# Patient Record
Sex: MALE | Race: WHITE | NOT HISPANIC OR LATINO
[De-identification: names, ages, dates, MRNs, and addresses within clinical notes are randomized per-mention and may not be internally consistent; named-entity substitution may affect disease eponyms.]

---

## 2018-08-07 PROBLEM — Z00.00 ENCOUNTER FOR PREVENTIVE HEALTH EXAMINATION: Status: ACTIVE | Noted: 2018-08-07

## 2018-08-28 ENCOUNTER — OTHER (OUTPATIENT)
Age: 68
End: 2018-08-28

## 2018-08-28 DIAGNOSIS — M25.559 PAIN IN UNSPECIFIED HIP: ICD-10-CM

## 2018-09-04 ENCOUNTER — APPOINTMENT (OUTPATIENT)
Dept: ORTHOPEDIC SURGERY | Facility: CLINIC | Age: 68
End: 2018-09-04
Payer: OTHER MISCELLANEOUS

## 2018-09-04 VITALS
DIASTOLIC BLOOD PRESSURE: 69 MMHG | HEIGHT: 69 IN | SYSTOLIC BLOOD PRESSURE: 120 MMHG | BODY MASS INDEX: 33.33 KG/M2 | HEART RATE: 93 BPM | WEIGHT: 225 LBS | TEMPERATURE: 97.8 F

## 2018-09-04 DIAGNOSIS — Z87.39 PERSONAL HISTORY OF OTHER DISEASES OF THE MUSCULOSKELETAL SYSTEM AND CONNECTIVE TISSUE: ICD-10-CM

## 2018-09-04 DIAGNOSIS — Z78.9 OTHER SPECIFIED HEALTH STATUS: ICD-10-CM

## 2018-09-04 DIAGNOSIS — Z86.39 PERSONAL HISTORY OF OTHER ENDOCRINE, NUTRITIONAL AND METABOLIC DISEASE: ICD-10-CM

## 2018-09-04 DIAGNOSIS — F17.200 NICOTINE DEPENDENCE, UNSPECIFIED, UNCOMPLICATED: ICD-10-CM

## 2018-09-04 PROCEDURE — 99205 OFFICE O/P NEW HI 60 MIN: CPT

## 2018-09-04 PROCEDURE — 73502 X-RAY EXAM HIP UNI 2-3 VIEWS: CPT | Mod: RT

## 2018-09-04 RX ORDER — CYCLOBENZAPRINE HYDROCHLORIDE 10 MG/1
10 TABLET, FILM COATED ORAL
Qty: 60 | Refills: 0 | Status: ACTIVE | COMMUNITY
Start: 2018-08-16

## 2018-09-04 RX ORDER — ROSUVASTATIN CALCIUM 40 MG/1
40 TABLET, FILM COATED ORAL
Qty: 90 | Refills: 0 | Status: ACTIVE | COMMUNITY
Start: 2018-04-20

## 2018-09-04 RX ORDER — PIOGLITAZONE HYDROCHLORIDE 45 MG/1
45 TABLET ORAL
Qty: 90 | Refills: 0 | Status: ACTIVE | COMMUNITY
Start: 2018-04-20

## 2019-09-09 ENCOUNTER — TRANSCRIPTION ENCOUNTER (OUTPATIENT)
Age: 69
End: 2019-09-09

## 2019-09-10 ENCOUNTER — OUTPATIENT (OUTPATIENT)
Dept: OUTPATIENT SERVICES | Facility: HOSPITAL | Age: 69
LOS: 1 days | End: 2019-09-10
Payer: COMMERCIAL

## 2019-09-10 DIAGNOSIS — M16.51 UNILATERAL POST-TRAUMATIC OSTEOARTHRITIS, RIGHT HIP: ICD-10-CM

## 2019-09-10 LAB — GLUCOSE BLDC GLUCOMTR-MCNC: 96 MG/DL — SIGNIFICANT CHANGE UP (ref 70–99)

## 2019-09-10 PROCEDURE — 82962 GLUCOSE BLOOD TEST: CPT

## 2019-09-10 PROCEDURE — 20610 DRAIN/INJ JOINT/BURSA W/O US: CPT | Mod: RT

## 2019-09-10 PROCEDURE — 77002 NEEDLE LOCALIZATION BY XRAY: CPT

## 2020-02-04 ENCOUNTER — APPOINTMENT (OUTPATIENT)
Dept: ORTHOPEDIC SURGERY | Facility: CLINIC | Age: 70
End: 2020-02-04
Payer: OTHER MISCELLANEOUS

## 2020-02-04 VITALS
HEART RATE: 108 BPM | HEIGHT: 69 IN | WEIGHT: 225 LBS | BODY MASS INDEX: 33.33 KG/M2 | DIASTOLIC BLOOD PRESSURE: 61 MMHG | SYSTOLIC BLOOD PRESSURE: 95 MMHG

## 2020-02-04 PROCEDURE — 73502 X-RAY EXAM HIP UNI 2-3 VIEWS: CPT | Mod: RT

## 2020-02-04 PROCEDURE — 99215 OFFICE O/P EST HI 40 MIN: CPT

## 2020-02-04 NOTE — DISCUSSION/SUMMARY
[de-identified] : The patient is a 69 year old male with bone on bone arthritis of the right hip. He was recommended to quit smoking prior to surgery. Based upon the patients continued symptoms and failure to respond to conservative treatment I have recommended a right total hip replacement for the patient. A discussion was had with the patient regarding a right total hip replacement. Anterior and posterior exposures were discussed. The patient would like to proceed with an anterior approach. A long discussion was had with the patient as what the total joint replacement would entail. A model was used to demonstrate the operation and to discuss bearing surfaces of the implants. The hospitalization and rehabilitation were discussed. The use of perioperative antibiotics and DVT prophylaxis were discussed. The risks, benefits and alternatives to surgical intervention were discussed at length with the patient. Specific risks discussed included: infection, wound breakdown, numbness and damage to nerves, tendon, muscle, arteries or other blood vessels, and the possibility of leg length discrepancy. The possibility of recurrent pain, no improvement in pain and actual worsening of the pain were also mentioned in conversation with the patient. Medical complications related to the patient's general medical health including deep vein thrombosis, pulmonary embolus, heart attack, stroke, death and other complications from anesthesia were discussed as well. The patient was told that we will take steps to minimize these risks by using sterile technique, antibiotics and DVT prophylaxis when appropriate and following the patient postoperatively in the clinic setting to monitor progress. The benefits of surgery were discussed with the patient including the potential to improve the current clinical condition through operative intervention. Alternatives to surgical intervention include continued conservative management which may yield less than optimal results in this particular patient. All questions were answered to the satisfaction of the patient. Models were used as an educational tool. We did discuss implant choices and fixation, with shared decision making with the patient.\par  \par The patient smokes 1 pack/day.  He was advised to stop smoking prior to surgery.  He was offered medication and or a patch by his primary care doctor and he will follow-up to start the smoking cessation program with him.  We discussed that smoking can increase his risk of wound healing complications and infection more than 5 fold.  I would like to see him back 1 month prior to surgery to see if he has been successful with stopping smoking.

## 2020-02-04 NOTE — HISTORY OF PRESENT ILLNESS
[de-identified] : The patient is a 69 year old male being seen for evaluation of his right hip. He has advanced osteoarthritis of the right hip. Last seen in the office in September of 2018 at which time he wished to schedule joint replacement, however he needed worker compensation approval. He is ambulating and transferring with pain and stiffness. He reports pain is centered in the groin. He reports locking and giving way. He has difficulty donning/doffing shoes and socks and difficulty with transitioning in and out of the car. He previously received ultrasound-guided cortisone injections in the right hip with mild relief of symptoms. He denies history of PE or DVT. He reports a history of smoking. His right hip pain is severely limiting his quality of life at this time. He comes in today for repeat evaluation and for further treatment options.

## 2020-02-04 NOTE — ADDENDUM
[FreeTextEntry1] : This note was authored by Ahmet Flynn working as a medical scribe for Dr. Vu Pacheco. The note was reviewed, edited, and revised by Dr. Vu Pacheco whom is in agreement with the exam findings, imaging findings, and treatment plan. 02/04/2020.

## 2020-02-04 NOTE — PHYSICAL EXAM
[de-identified] : The patient appears well nourished  and in no apparent distress.  The patient is alert and oriented to person, place, and time.   Affect and mood appear normal. The head is normocephalic and atraumatic.  The eyes reveal normal sclera and extra ocular muscles are intact. The tongue is midline with no apparent lesions.  Skin shows normal turgor with no evidence of eczema or psoriasis.  No respiratory distress noted.  Sensation grossly intact.\par   [de-identified] : Exam of the right hip shows hip flexion of 80 degrees, hip external rotation of 30 degrees, internal rotation is neutral and painful. 5/5 motor strength bilaterally distally. Sensation intact distally. Difficulty lifting the right leg onto the exam table. The right leg is 2 cm short compared to the left. [de-identified] : Xray- AP pelvis and 2 views right hip shows bone on bone arthritis of the right hip.

## 2020-03-10 ENCOUNTER — APPOINTMENT (OUTPATIENT)
Dept: ORTHOPEDIC SURGERY | Facility: CLINIC | Age: 70
End: 2020-03-10
Payer: OTHER MISCELLANEOUS

## 2020-03-10 VITALS
WEIGHT: 225 LBS | HEART RATE: 92 BPM | BODY MASS INDEX: 33.33 KG/M2 | HEIGHT: 69 IN | SYSTOLIC BLOOD PRESSURE: 126 MMHG | DIASTOLIC BLOOD PRESSURE: 73 MMHG

## 2020-03-10 PROCEDURE — 99212 OFFICE O/P EST SF 10 MIN: CPT

## 2020-03-10 NOTE — DISCUSSION/SUMMARY
[de-identified] : The patient is a 69 year old male with bone on bone arthritis of the right hip.  The patient has almost completely stopped smoking which is excellent.  He was recently approved for Chantyx and will stop smoking altogether at this point.  We will therefore move forward with surgery as scheduled.

## 2020-03-10 NOTE — PHYSICAL EXAM
[de-identified] : The patient appears well nourished  and in no apparent distress.  The patient is alert and oriented to person, place, and time.   Affect and mood appear normal. The head is normocephalic and atraumatic.  The eyes reveal normal sclera and extra ocular muscles are intact. The tongue is midline with no apparent lesions.  Skin shows normal turgor with no evidence of eczema or psoriasis.  No respiratory distress noted.  Sensation grossly intact.\par   [de-identified] : Exam of the right hip shows hip flexion of 80 degrees, hip external rotation of 30 degrees, internal rotation is neutral and painful. 5/5 motor strength bilaterally distally. Sensation intact distally. Difficulty lifting the right leg onto the exam table. The right leg is 2 cm short compared to the left.

## 2020-03-10 NOTE — HISTORY OF PRESENT ILLNESS
[de-identified] : The patient is a 69 year old male being seen for evaluation of his right hip. He has advanced osteoarthritis of the right hip. He is scheduled for surgery for April 8th. He is ambulating and transferring with pain and stiffness. He reports pain is centered in the groin. He reports locking and giving way. He has difficulty donning/doffing shoes and socks and difficulty with transitioning in and out of the car. He previously received ultrasound-guided cortisone injections in the right hip with mild relief of symptoms. He denies history of PE or DVT. He reports a history of smoking. His right hip pain is severely limiting his quality of life at this time. He comes in today to evaluate his progress on discontinuing smoking prior to surgery.  Reports smoking only 5 cigarettes since his last visit, his last cigarette was 3 days ago.

## 2020-03-10 NOTE — ADDENDUM
[FreeTextEntry1] : This note was authored by Ahmet Flynn working as a medical scribe for Dr. Vu Pacheco. The note was reviewed, edited, and revised by Dr. Vu Pacheco whom is in agreement with the exam findings, imaging findings, and treatment plan. 03/10/2020.

## 2020-04-08 ENCOUNTER — APPOINTMENT (OUTPATIENT)
Dept: ORTHOPEDIC SURGERY | Facility: HOSPITAL | Age: 70
End: 2020-04-08

## 2020-04-09 PROBLEM — M25.559 HIP PAIN: Status: ACTIVE | Noted: 2018-08-28

## 2020-04-23 ENCOUNTER — APPOINTMENT (OUTPATIENT)
Dept: ORTHOPEDIC SURGERY | Facility: CLINIC | Age: 70
End: 2020-04-23

## 2020-05-21 ENCOUNTER — APPOINTMENT (OUTPATIENT)
Dept: ORTHOPEDIC SURGERY | Facility: CLINIC | Age: 70
End: 2020-05-21

## 2020-09-03 ENCOUNTER — APPOINTMENT (OUTPATIENT)
Dept: ORTHOPEDIC SURGERY | Facility: CLINIC | Age: 70
End: 2020-09-03

## 2020-09-04 ENCOUNTER — TRANSCRIPTION ENCOUNTER (OUTPATIENT)
Age: 70
End: 2020-09-04

## 2020-09-16 ENCOUNTER — APPOINTMENT (OUTPATIENT)
Dept: ORTHOPEDIC SURGERY | Facility: HOSPITAL | Age: 70
End: 2020-09-16

## 2020-10-08 ENCOUNTER — APPOINTMENT (OUTPATIENT)
Dept: ORTHOPEDIC SURGERY | Facility: CLINIC | Age: 70
End: 2020-10-08

## 2020-10-09 ENCOUNTER — APPOINTMENT (OUTPATIENT)
Dept: ORTHOPEDIC SURGERY | Facility: CLINIC | Age: 70
End: 2020-10-09
Payer: OTHER MISCELLANEOUS

## 2020-10-09 VITALS
BODY MASS INDEX: 31.1 KG/M2 | DIASTOLIC BLOOD PRESSURE: 63 MMHG | WEIGHT: 210 LBS | SYSTOLIC BLOOD PRESSURE: 102 MMHG | HEIGHT: 69 IN | HEART RATE: 108 BPM

## 2020-10-09 DIAGNOSIS — M16.11 UNILATERAL PRIMARY OSTEOARTHRITIS, RIGHT HIP: ICD-10-CM

## 2020-10-09 PROCEDURE — 73502 X-RAY EXAM HIP UNI 2-3 VIEWS: CPT | Mod: RT

## 2020-10-09 PROCEDURE — 99214 OFFICE O/P EST MOD 30 MIN: CPT

## 2020-10-09 NOTE — HISTORY OF PRESENT ILLNESS
[de-identified] : The patient is a 69 year old male being seen for evaluation of his right hip. He has advanced osteoarthritis of the right hip. He reports undergoing cardiac stenting in August of this year and is on Plavix.  He also underwent femoral bypass.  He reports being cleared by his vascular surgeon with regard to his femoral bypass.  However he is now on anticoagulation for his cardiac stent.  He is ambulating and transferring with pain and stiffness. He reports using a cane for stability. He reports pain is centered in the groin with radiation down the leg anterolaterally. He reports locking and giving way. He has difficulty donning/doffing shoes and socks and difficulty with transitioning in and out of the car. He previously received ultrasound-guided cortisone injections in the right hip with minimal relief of symptoms.. His right hip pain is severely limiting his quality of life at this time. He comes in today to evaluate and for further treatment options.

## 2020-10-09 NOTE — PHYSICAL EXAM
[de-identified] : The patient appears well nourished  and in no apparent distress.  The patient is alert and oriented to person, place, and time.   Affect and mood appear normal. The head is normocephalic and atraumatic.  The eyes reveal normal sclera and extra ocular muscles are intact. The tongue is midline with no apparent lesions.  Skin shows normal turgor with no evidence of eczema or psoriasis.  No respiratory distress noted.  Sensation grossly intact.\par   [de-identified] : Exam of the right hip shows difficulty with SLR, hip flexion of 70 degrees, hip external rotation of 15 degrees, internal rotation is neutral. 5/5 motor strength bilaterally distally. Sensation intact distally.  [de-identified] : Xray- AP pelvis and 2 views right hip shows bone on bone arthritis of the right hip.

## 2020-10-09 NOTE — ADDENDUM
[FreeTextEntry1] : This note was authored by Ahmet Flynn working as a medical scribe for Dr. Vu Pacheco. The note was reviewed, edited, and revised by Dr. Vu Pacheco whom is in agreement with the exam findings, imaging findings, and treatment plan. 10/09/2020.

## 2020-10-09 NOTE — DISCUSSION/SUMMARY
[de-identified] : The patient is a 70 year old male with bone on bone arthritis of the right hip.  His hip replacement surgery was postponed because of the need for vascular bypass surgery to his lower extremity as well as cardiac stenting.  He is now on Plavix.  He is going to consult with his cardiologist regarding timing of hip replacement surgery with regard to stopping his Plavix which typically is 1 year after stenting.  Because of that I recommended another cortisone injection in the right hip for now.

## 2020-10-12 ENCOUNTER — FORM ENCOUNTER (OUTPATIENT)
Age: 70
End: 2020-10-12

## 2020-10-20 ENCOUNTER — FORM ENCOUNTER (OUTPATIENT)
Age: 70
End: 2020-10-20

## 2020-10-29 ENCOUNTER — APPOINTMENT (OUTPATIENT)
Dept: ORTHOPEDIC SURGERY | Facility: CLINIC | Age: 70
End: 2020-10-29

## 2021-11-17 ENCOUNTER — APPOINTMENT (OUTPATIENT)
Dept: DERMATOLOGY | Facility: CLINIC | Age: 71
End: 2021-11-17
Payer: MEDICARE

## 2021-11-17 PROCEDURE — 17004 DESTROY PREMAL LESIONS 15/>: CPT

## 2021-11-17 PROCEDURE — 99203 OFFICE O/P NEW LOW 30 MIN: CPT | Mod: 25

## 2021-12-07 ENCOUNTER — APPOINTMENT (OUTPATIENT)
Dept: ORTHOPEDIC SURGERY | Facility: CLINIC | Age: 71
End: 2021-12-07
Payer: OTHER MISCELLANEOUS

## 2021-12-07 VITALS
HEIGHT: 69 IN | DIASTOLIC BLOOD PRESSURE: 72 MMHG | BODY MASS INDEX: 30.81 KG/M2 | HEART RATE: 89 BPM | SYSTOLIC BLOOD PRESSURE: 111 MMHG | WEIGHT: 208 LBS

## 2021-12-07 PROCEDURE — 99215 OFFICE O/P EST HI 40 MIN: CPT

## 2021-12-07 PROCEDURE — 73502 X-RAY EXAM HIP UNI 2-3 VIEWS: CPT | Mod: RT

## 2021-12-07 PROCEDURE — 99072 ADDL SUPL MATRL&STAF TM PHE: CPT

## 2021-12-07 RX ORDER — OXYCODONE HYDROCHLORIDE AND ACETAMINOPHEN 10; 325 MG/1; MG/1
10-325 TABLET ORAL
Refills: 0 | Status: ACTIVE | COMMUNITY

## 2021-12-07 RX ORDER — AMOXICILLIN AND CLAVULANATE POTASSIUM 875; 125 MG/1; MG/1
875-125 TABLET, COATED ORAL
Qty: 20 | Refills: 0 | Status: DISCONTINUED | COMMUNITY
Start: 2018-04-20 | End: 2021-12-07

## 2021-12-07 RX ORDER — EZETIMIBE 10 MG/1
10 TABLET ORAL
Refills: 0 | Status: ACTIVE | COMMUNITY

## 2021-12-07 RX ORDER — SEMAGLUTIDE 0.68 MG/ML
INJECTION, SOLUTION SUBCUTANEOUS
Refills: 0 | Status: ACTIVE | COMMUNITY

## 2021-12-07 RX ORDER — LIRAGLUTIDE 6 MG/ML
18 INJECTION SUBCUTANEOUS
Qty: 27 | Refills: 0 | Status: DISCONTINUED | COMMUNITY
Start: 2017-11-14 | End: 2021-12-07

## 2021-12-07 RX ORDER — OXYCODONE 13.5 MG/1
13.5 CAPSULE, EXTENDED RELEASE ORAL
Refills: 0 | Status: ACTIVE | COMMUNITY

## 2021-12-07 RX ORDER — TAMSULOSIN HYDROCHLORIDE 0.4 MG/1
0.4 CAPSULE ORAL
Refills: 0 | Status: ACTIVE | COMMUNITY

## 2021-12-07 RX ORDER — CLOPIDOGREL 75 MG/1
75 TABLET, FILM COATED ORAL
Refills: 0 | Status: ACTIVE | COMMUNITY

## 2021-12-07 RX ORDER — ASPIRIN ENTERIC COATED TABLETS 81 MG 81 MG/1
81 TABLET, DELAYED RELEASE ORAL
Refills: 0 | Status: ACTIVE | COMMUNITY

## 2021-12-07 RX ORDER — HYDROCODONE BITARTRATE AND ACETAMINOPHEN 10; 325 MG/1; MG/1
10-325 TABLET ORAL
Qty: 120 | Refills: 0 | Status: DISCONTINUED | COMMUNITY
Start: 2018-06-13 | End: 2021-12-07

## 2021-12-07 RX ORDER — LOSARTAN POTASSIUM 25 MG/1
25 TABLET, FILM COATED ORAL
Qty: 90 | Refills: 0 | Status: DISCONTINUED | COMMUNITY
Start: 2018-04-20 | End: 2021-12-07

## 2021-12-07 NOTE — HISTORY OF PRESENT ILLNESS
[de-identified] : Patient is a 71-year-old male presenting for follow-up evaluation of right hip pain.  This is a workers comp case related to an incident in 2006 when a washing machine fell onto the patient.  His right hip pain is localized to the right groin and is worse with all weightbearing to be putting walking distance and rising from seated position.  He admits to worsening stiffness of the hip which is now interfering with ADLs such as putting on socks and shoes.  He has not had recent injections into the hip.  He has tried therapy without significant improvement.  He takes Tylenol and NSAIDs without significant relief.  Patient had cardiac stenting in August 2020, now currently only on aspirin 81.  He also had a femoral artery bypass in August 2020 without residual issues today. He presents hopeful to discus THR, and states he has been cleared by his cardiologist and vascular doctors. \par DM with unknown A1C

## 2021-12-07 NOTE — ADDENDUM
[FreeTextEntry1] : This note was authored by Bautista Montoya working as a medical scribe for Dr. Vu Pacheco. The note was reviewed, edited, and revised by Dr. Vu Pacheco whom is in agreement with the exam findings, imaging findings, and treatment plan. 12/07/2021

## 2021-12-07 NOTE — DISCUSSION/SUMMARY
[de-identified] : BARAK DUBON is a 71 year male who presents with.  Based upon the patients continued symptoms and failure to respond to conservative treatment I have recommended a right total hip replacement for the patient. A discussion was had with the patient regarding a right total hip replacement. Anterior and posterior exposures were discussed. For this patient an anterior approach is appropriate and will confirm with vascular that this approach wont encroach on the femoral bypass site. A long discussion was had with the patient as what the total joint replacement would entail. A model was used to demonstrate the operation and to discuss bearing surfaces of the implants. The hospitalization and rehabilitation were discussed. The use of perioperative antibiotics and DVT prophylaxis were discussed. The risks, benefits and alternatives to surgical intervention were discussed at length with the patient. Specific risks discussed included: infection, wound breakdown, numbness and damage to nerves, tendon, muscle, arteries or other blood vessels, and the possibility of leg length discrepancy. The possibility of recurrent pain, no improvement in pain and actual worsening of the pain were also mentioned in conversation with the patient. Medical complications related to the patient's general medical health including deep vein thrombosis, pulmonary embolism, heart attack, stroke, death and other complications from anesthesia were discussed as well. The patient was told that we will take steps to minimize these risks by using sterile technique, antibiotics and DVT prophylaxis when appropriate and following the patient postoperatively in the clinic setting to monitor progress. The benefits of surgery were discussed with the patient including the potential to improve the current clinical condition through operative intervention. Alternatives to surgical intervention include continued conservative management which may yield less than optimal results in this particular patient. All questions were answered to the satisfaction of the patient. Models were used as an educational tool. We did discuss implant choices and fixation, with shared decision making with the patient.		 \par We are planning for robotic assistance for the total hip replacement. We discussed that this will require placement of iliac crest pins in the contralateral iliac crest for pelvic bone registration to allow for robotic guidance for the surgery. We will utilize robotic assistance to improve accuracy of the implants, and accurate restoration of both leg lengths and femoral offset.

## 2021-12-07 NOTE — REASON FOR VISIT
[Follow-Up Visit] : a follow-up visit for [Workers' Comp: Date of Injury: _______] : This visit is related to worker's compensation. Date of Injury: [unfilled] [Other: ____] : [unfilled]

## 2021-12-07 NOTE — PHYSICAL EXAM
[de-identified] : The patient appears well nourished  and in no apparent distress.  The patient is alert and oriented to person, place, and time.   Affect and mood appear normal. The head is normocephalic and atraumatic.  The eyes reveal normal sclera and extra ocular muscles are intact. The tongue is midline with no apparent lesions.  Skin shows normal turgor with no evidence of eczema or psoriasis.  No respiratory distress noted.  Sensation grossly intact.		  [de-identified] : Exam of the right hip shows hip flexion of 70 degrees, hip external rotation of 15 degrees, hip internal rotation of 0 degrees. there are palpable DP and PT pulses RLE.\par  5/5 motor strength bilaterally distally. Sensation intact distally.	  [de-identified] : X-ray: AP view of the pelvis and 2 views of the right hip demonstrate bone on bone arthritis.

## 2022-01-18 ENCOUNTER — OUTPATIENT (OUTPATIENT)
Dept: OUTPATIENT SERVICES | Facility: HOSPITAL | Age: 72
LOS: 1 days | End: 2022-01-18
Payer: COMMERCIAL

## 2022-01-18 VITALS
HEART RATE: 52 BPM | OXYGEN SATURATION: 98 % | RESPIRATION RATE: 20 BRPM | SYSTOLIC BLOOD PRESSURE: 154 MMHG | WEIGHT: 206.79 LBS | DIASTOLIC BLOOD PRESSURE: 80 MMHG | HEIGHT: 69 IN | TEMPERATURE: 97 F

## 2022-01-18 DIAGNOSIS — E11.9 TYPE 2 DIABETES MELLITUS WITHOUT COMPLICATIONS: ICD-10-CM

## 2022-01-18 DIAGNOSIS — Z95.5 PRESENCE OF CORONARY ANGIOPLASTY IMPLANT AND GRAFT: Chronic | ICD-10-CM

## 2022-01-18 DIAGNOSIS — Z96.652 PRESENCE OF LEFT ARTIFICIAL KNEE JOINT: Chronic | ICD-10-CM

## 2022-01-18 DIAGNOSIS — Z95.828 PRESENCE OF OTHER VASCULAR IMPLANTS AND GRAFTS: Chronic | ICD-10-CM

## 2022-01-18 DIAGNOSIS — M16.11 UNILATERAL PRIMARY OSTEOARTHRITIS, RIGHT HIP: ICD-10-CM

## 2022-01-18 DIAGNOSIS — Z95.1 PRESENCE OF AORTOCORONARY BYPASS GRAFT: Chronic | ICD-10-CM

## 2022-01-18 DIAGNOSIS — Z01.818 ENCOUNTER FOR OTHER PREPROCEDURAL EXAMINATION: ICD-10-CM

## 2022-01-18 DIAGNOSIS — I25.10 ATHEROSCLEROTIC HEART DISEASE OF NATIVE CORONARY ARTERY WITHOUT ANGINA PECTORIS: ICD-10-CM

## 2022-01-18 DIAGNOSIS — Z98.890 OTHER SPECIFIED POSTPROCEDURAL STATES: Chronic | ICD-10-CM

## 2022-01-18 DIAGNOSIS — Z29.9 ENCOUNTER FOR PROPHYLACTIC MEASURES, UNSPECIFIED: ICD-10-CM

## 2022-01-18 DIAGNOSIS — Z96.82 PRESENCE OF NEUROSTIMULATOR: Chronic | ICD-10-CM

## 2022-01-18 DIAGNOSIS — Z92.241 PERSONAL HISTORY OF SYSTEMIC STEROID THERAPY: Chronic | ICD-10-CM

## 2022-01-18 LAB
A1C WITH ESTIMATED AVERAGE GLUCOSE RESULT: 5.8 % — HIGH (ref 4–5.6)
ANION GAP SERPL CALC-SCNC: 12 MMOL/L — SIGNIFICANT CHANGE UP (ref 5–17)
APTT BLD: 31.9 SEC — SIGNIFICANT CHANGE UP (ref 27.5–35.5)
BASOPHILS # BLD AUTO: 0.11 K/UL — SIGNIFICANT CHANGE UP (ref 0–0.2)
BASOPHILS NFR BLD AUTO: 1.5 % — SIGNIFICANT CHANGE UP (ref 0–2)
BLD GP AB SCN SERPL QL: SIGNIFICANT CHANGE UP
BUN SERPL-MCNC: 16.6 MG/DL — SIGNIFICANT CHANGE UP (ref 8–20)
CALCIUM SERPL-MCNC: 9.5 MG/DL — SIGNIFICANT CHANGE UP (ref 8.6–10.2)
CHLORIDE SERPL-SCNC: 103 MMOL/L — SIGNIFICANT CHANGE UP (ref 98–107)
CO2 SERPL-SCNC: 27 MMOL/L — SIGNIFICANT CHANGE UP (ref 22–29)
CREAT SERPL-MCNC: 0.76 MG/DL — SIGNIFICANT CHANGE UP (ref 0.5–1.3)
EOSINOPHIL # BLD AUTO: 0.1 K/UL — SIGNIFICANT CHANGE UP (ref 0–0.5)
EOSINOPHIL NFR BLD AUTO: 1.3 % — SIGNIFICANT CHANGE UP (ref 0–6)
ESTIMATED AVERAGE GLUCOSE: 120 MG/DL — HIGH (ref 68–114)
GLUCOSE SERPL-MCNC: 102 MG/DL — HIGH (ref 70–99)
HCT VFR BLD CALC: 42.9 % — SIGNIFICANT CHANGE UP (ref 39–50)
HGB BLD-MCNC: 13.1 G/DL — SIGNIFICANT CHANGE UP (ref 13–17)
IMM GRANULOCYTES NFR BLD AUTO: 0.4 % — SIGNIFICANT CHANGE UP (ref 0–1.5)
INR BLD: 0.99 RATIO — SIGNIFICANT CHANGE UP (ref 0.88–1.16)
LYMPHOCYTES # BLD AUTO: 2.04 K/UL — SIGNIFICANT CHANGE UP (ref 1–3.3)
LYMPHOCYTES # BLD AUTO: 27.4 % — SIGNIFICANT CHANGE UP (ref 13–44)
MCHC RBC-ENTMCNC: 28.9 PG — SIGNIFICANT CHANGE UP (ref 27–34)
MCHC RBC-ENTMCNC: 30.5 GM/DL — LOW (ref 32–36)
MCV RBC AUTO: 94.5 FL — SIGNIFICANT CHANGE UP (ref 80–100)
MONOCYTES # BLD AUTO: 0.58 K/UL — SIGNIFICANT CHANGE UP (ref 0–0.9)
MONOCYTES NFR BLD AUTO: 7.8 % — SIGNIFICANT CHANGE UP (ref 2–14)
MRSA PCR RESULT.: SIGNIFICANT CHANGE UP
NEUTROPHILS # BLD AUTO: 4.59 K/UL — SIGNIFICANT CHANGE UP (ref 1.8–7.4)
NEUTROPHILS NFR BLD AUTO: 61.6 % — SIGNIFICANT CHANGE UP (ref 43–77)
PLATELET # BLD AUTO: 327 K/UL — SIGNIFICANT CHANGE UP (ref 150–400)
POTASSIUM SERPL-MCNC: 4.6 MMOL/L — SIGNIFICANT CHANGE UP (ref 3.5–5.3)
POTASSIUM SERPL-SCNC: 4.6 MMOL/L — SIGNIFICANT CHANGE UP (ref 3.5–5.3)
PROTHROM AB SERPL-ACNC: 11.5 SEC — SIGNIFICANT CHANGE UP (ref 10.6–13.6)
RBC # BLD: 4.54 M/UL — SIGNIFICANT CHANGE UP (ref 4.2–5.8)
RBC # FLD: 15.5 % — HIGH (ref 10.3–14.5)
S AUREUS DNA NOSE QL NAA+PROBE: SIGNIFICANT CHANGE UP
SODIUM SERPL-SCNC: 142 MMOL/L — SIGNIFICANT CHANGE UP (ref 135–145)
WBC # BLD: 7.45 K/UL — SIGNIFICANT CHANGE UP (ref 3.8–10.5)
WBC # FLD AUTO: 7.45 K/UL — SIGNIFICANT CHANGE UP (ref 3.8–10.5)

## 2022-01-18 PROCEDURE — G0463: CPT

## 2022-01-18 PROCEDURE — 93010 ELECTROCARDIOGRAM REPORT: CPT

## 2022-01-18 PROCEDURE — 93005 ELECTROCARDIOGRAM TRACING: CPT

## 2022-01-18 RX ORDER — EZETIMIBE 10 MG/1
0 TABLET ORAL
Qty: 0 | Refills: 0 | DISCHARGE

## 2022-01-18 RX ORDER — TAMSULOSIN HYDROCHLORIDE 0.4 MG/1
0 CAPSULE ORAL
Qty: 0 | Refills: 0 | DISCHARGE

## 2022-01-18 RX ORDER — CLOPIDOGREL BISULFATE 75 MG/1
0 TABLET, FILM COATED ORAL
Qty: 0 | Refills: 0 | DISCHARGE

## 2022-01-18 RX ORDER — PIOGLITAZONE HYDROCHLORIDE 15 MG/1
0 TABLET ORAL
Qty: 0 | Refills: 0 | DISCHARGE

## 2022-01-18 RX ORDER — ROSUVASTATIN CALCIUM 5 MG/1
0 TABLET ORAL
Qty: 0 | Refills: 0 | DISCHARGE

## 2022-01-18 NOTE — H&P PST ADULT - PROBLEM SELECTOR PLAN 3
cardiac clearance with Dr. Bradley  Asked the patient to consult with  cardiologist about holding ASA and the pt  agreed.

## 2022-01-18 NOTE — H&P PST ADULT - NSICDXPASTMEDICALHX_GEN_ALL_CORE_FT
PAST MEDICAL HISTORY:  BPH without urinary obstruction     Diabetes mellitus     GERD (gastroesophageal reflux disease)     HLD (hyperlipidemia)     OA (osteoarthritis)     Seasonal allergies      PAST MEDICAL HISTORY:  BPH without urinary obstruction     Diabetes mellitus     GERD (gastroesophageal reflux disease)     HLD (hyperlipidemia)     OA (osteoarthritis)     Peptic ulcer     Seasonal allergies      PAST MEDICAL HISTORY:  BPH without urinary obstruction     CAD (coronary artery disease)     Diabetes mellitus     GERD (gastroesophageal reflux disease)     HLD (hyperlipidemia)     OA (osteoarthritis)     Peptic ulcer     Seasonal allergies

## 2022-01-18 NOTE — H&P PST ADULT - NS MD HP INPLANTS MED DEV
Piriformis Syndrome: Exercises Introduction Here are some examples of exercises for you to try. The exercises may be suggested for a condition or for rehabilitation. Start each exercise slowly. Ease off the exercises if you start to have pain. You will be told when to start these exercises and which ones will work best for you. How to do the exercises Hip rotator stretch 1. Lie on your back with both knees bent and your feet flat on the floor. 2. Put the ankle of your affected leg on your opposite thigh near your knee. 3. Use your hand to gently push your knee (on your affected leg) away from your body until you feel a gentle stretch around your hip. 4. Hold the stretch for 15 to 30 seconds. 5. Repeat 2 to 4 times. 6. Switch legs and repeat steps 1 through 5. Piriformis stretch 1. Lie on your back with your legs straight. 2. Lift your affected leg and bend your knee. With your opposite hand, reach across your body, and then gently pull your knee toward your opposite shoulder. 3. Hold the stretch for 15 to 30 seconds. 4. Repeat with your other leg. 5. Repeat 2 to 4 times on each side. Lower abdominal strengthening 1. Lie on your back with your knees bent and your feet flat on the floor. 2. Tighten your belly muscles by pulling your belly button in toward your spine. 3. Lift one foot off the floor and bring your knee toward your chest, so that your knee is straight above your hip and your leg is bent like the letter \"L. \" 
4. Lift the other knee up to the same position. 5. Lower one leg at a time to the starting position. 6. Keep alternating legs until you have lifted each leg 8 to 12 times. 7. Be sure to keep your belly muscles tight and your back still as you are moving your legs. Be sure to breathe normally. Follow-up care is a key part of your treatment and safety.  Be sure to make and go to all appointments, and call your doctor if you are having problems. It's also a good idea to know your test results and keep a list of the medicines you take. Current as of: June 26, 2019 Content Version: 12.2 © 2809-0984 [employer]. Care instructions adapted under license by [employer] (which disclaims liability or warranty for this information). If you have questions about a medical condition or this instruction, always ask your healthcare professional. [address] any warranty or liability for your use of this information. Low Back Pain: Exercises Introduction Here are some examples of exercises for you to try. The exercises may be suggested for a condition or for rehabilitation. Start each exercise slowly. Ease off the exercises if you start to have pain. You will be told when to start these exercises and which ones will work best for you. How to do the exercises Press-up 1. Lie on your stomach, supporting your body with your forearms. 2. Press your elbows down into the floor to raise your upper back. As you do this, relax your stomach muscles and allow your back to arch without using your back muscles. As your press up, do not let your hips or pelvis come off the floor. 3. Hold for 15 to 30 seconds, then relax. 4. Repeat 2 to 4 times. Alternate arm and leg (bird dog) exercise 1. Start on the floor, on your hands and knees. 2. Tighten your belly muscles. 3. Raise one leg off the floor, and hold it straight out behind you. Be careful not to let your hip drop down, because that will twist your trunk. 4. Hold for about 6 seconds, then lower your leg and switch to the other leg. 5. Repeat 8 to 12 times on each leg. 6. Over time, work up to holding for 10 to 30 seconds each time. 7. If you feel stable and secure with your leg raised, try raising the opposite arm straight out in front of you at the same time. Knee-to-chest exercise 1. Lie on your back with your knees bent and your feet flat on the floor. 2. Bring one knee to your chest, keeping the other foot flat on the floor (or keeping the other leg straight, whichever feels better on your lower back). 3. Keep your lower back pressed to the floor. Hold for at least 15 to 30 seconds. 4. Relax, and lower the knee to the starting position. 5. Repeat with the other leg. Repeat 2 to 4 times with each leg. 6. To get more stretch, put your other leg flat on the floor while pulling your knee to your chest. 
 
Curl-ups 1. Lie on the floor on your back with your knees bent at a 90-degree angle. Your feet should be flat on the floor, about 12 inches from your buttocks. 2. Cross your arms over your chest. If this bothers your neck, try putting your hands behind your neck (not your head), with your elbows spread apart. 3. Slowly tighten your belly muscles and raise your shoulder blades off the floor. 4. Keep your head in line with your body, and do not press your chin to your chest. 
5. Hold this position for 1 or 2 seconds, then slowly lower yourself back down to the floor. 6. Repeat 8 to 12 times. Pelvic tilt exercise 1. Lie on your back with your knees bent. 2. \"Brace\" your stomach. This means to tighten your muscles by pulling in and imagining your belly button moving toward your spine. You should feel like your back is pressing to the floor and your hips and pelvis are rocking back. 3. Hold for about 6 seconds while you breathe smoothly. 4. Repeat 8 to 12 times. Heel dig bridging 1. Lie on your back with both knees bent and your ankles bent so that only your heels are digging into the floor. Your knees should be bent about 90 degrees. 2. Then push your heels into the floor, squeeze your buttocks, and lift your hips off the floor until your shoulders, hips, and knees are all in a straight line.  
3. Hold for about 6 seconds as you continue to breathe normally, and then slowly lower your hips back down to the floor and rest for up to 10 seconds. 4. Do 8 to 12 repetitions. Hamstring stretch in doorway 1. Lie on your back in a doorway, with one leg through the open door. 2. Slide your leg up the wall to straighten your knee. You should feel a gentle stretch down the back of your leg. 3. Hold the stretch for at least 15 to 30 seconds. Do not arch your back, point your toes, or bend either knee. Keep one heel touching the floor and the other heel touching the wall. 4. Repeat with your other leg. 5. Do 2 to 4 times for each leg. Hip flexor stretch 1. Kneel on the floor with one knee bent and one leg behind you. Place your forward knee over your foot. Keep your other knee touching the floor. 2. Slowly push your hips forward until you feel a stretch in the upper thigh of your rear leg. 3. Hold the stretch for at least 15 to 30 seconds. Repeat with your other leg. 4. Do 2 to 4 times on each side. Wall sit 1. Stand with your back 10 to 12 inches away from a wall. 2. Lean into the wall until your back is flat against it. 3. Slowly slide down until your knees are slightly bent, pressing your lower back into the wall. 4. Hold for about 6 seconds, then slide back up the wall. 5. Repeat 8 to 12 times. Follow-up care is a key part of your treatment and safety. Be sure to make and go to all appointments, and call your doctor if you are having problems. It's also a good idea to know your test results and keep a list of the medicines you take. Where can you learn more? Go toa http://saray-leydi.info/. Enter N841 in the search box to learn more about \"Low Back Pain: Exercises. \" Current as of: June 26, 2019 Content Version: 12.2 © 1016-5619 [employer]. Care instructions adapted under license by [employer] (which disclaims liability or warranty for this information).  If you have questions about a medical condition or this instruction, always ask your healthcare professional. [place] [address] any warranty or liability for your use of this information. Low Back Arthritis: Exercises Introduction Here are some examples of typical rehabilitation exercises for your condition. Start each exercise slowly. Ease off the exercise if you start to have pain. Your doctor or physical therapist will tell you when you can start these exercises and which ones will work best for you. When you are not being active, find a comfortable position for rest. Some people are comfortable on the floor or a medium-firm bed with a small pillow under their head and another under their knees. Some people prefer to lie on their side with a pillow between their knees. Don't stay in one position for too long. Take short walks (10 to 20 minutes) every 2 to 3 hours. Avoid slopes, hills, and stairs until you feel better. Walk only distances you can manage without pain, especially leg pain. How to do the exercises Pelvic tilt 5. Lie on your back with your knees bent. 6. \"Brace\" your stomachtighten your muscles by pulling in and imagining your belly button moving toward your spine. 7. Press your lower back into the floor. You should feel your hips and pelvis rock back. 8. Hold for 6 seconds while breathing smoothly. 9. Relax and allow your pelvis and hips to rock forward. 10. Repeat 8 to 12 times. Back stretches 8. Get down on your hands and knees on the floor. 9. Relax your head and allow it to droop. Round your back up toward the ceiling until you feel a nice stretch in your upper, middle, and lower back. Hold this stretch for as long as it feels comfortable, or about 15 to 30 seconds. 10. Return to the starting position with a flat back while you are on your hands and knees. 11. Let your back sway by pressing your stomach toward the floor. Lift your buttocks toward the ceiling. 12. Hold this position for 15 to 30 seconds. 13. Repeat 2 to 4 times. Follow-up care is a key part of your treatment and safety. Be sure to make and go to all appointments, and call your doctor if you are having problems. It's also a good idea to know your test results and keep a list of the medicines you take. Where can you learn more? Go to http://saray-leydi.info/. Enter X706 in the search box to learn more about \"Low Back Arthritis: Exercises. \" Current as of: June 26, 2019 Content Version: 12.2 © 1127-6086 [employer]. Care instructions adapted under license by [employer] (which disclaims liability or warranty for this information). If you have questions about a medical condition or this instruction, always ask your healthcare professional. [address] any warranty or liability for your use of this information. Well Visit, Ages 25 to 48: Care Instructions Your Care Instructions Physical exams can help you stay healthy. Your doctor has checked your overall health and may have suggested ways to take good care of yourself. He or she also may have recommended tests. At home, you can help prevent illness with healthy eating, regular exercise, and other steps. Follow-up care is a key part of your treatment and safety. Be sure to make and go to all appointments, and call your doctor if you are having problems. It's also a good idea to know your test results and keep a list of the medicines you take. How can you care for yourself at home? · Reach and stay at a healthy weight. This will lower your risk for many problems, such as obesity, diabetes, heart disease, and high blood pressure. · Get at least 30 minutes of physical activity on most days of the week. Walking is a good choice. You also may want to do other activities, such as running, swimming, cycling, or playing tennis or team sports.  Discuss any changes in your exercise program with your doctor. · Do not smoke or allow others to smoke around you. If you need help quitting, talk to your doctor about stop-smoking programs and medicines. These can increase your chances of quitting for good. · Talk to your doctor about whether you have any risk factors for sexually transmitted infections (STIs). Having one sex partner (who does not have STIs and does not have sex with anyone else) is a good way to avoid these infections. · Use birth control if you do not want to have children at this time. Talk with your doctor about the choices available and what might be best for you. · Protect your skin from too much sun. When you're outdoors from 10 a.m. to 4 p.m., stay in the shade or cover up with clothing and a hat with a wide brim. Wear sunglasses that block UV rays. Even when it's cloudy, put broad-spectrum sunscreen (SPF 30 or higher) on any exposed skin. · See a dentist one or two times a year for checkups and to have your teeth cleaned. · Wear a seat belt in the car. Follow your doctor's advice about when to have certain tests. These tests can spot problems early. For everyone · Cholesterol. Have the fat (cholesterol) in your blood tested after age 21. Your doctor will tell you how often to have this done based on your age, family history, or other things that can increase your risk for heart disease. · Blood pressure. Have your blood pressure checked during a routine doctor visit. Your doctor will tell you how often to check your blood pressure based on your age, your blood pressure results, and other factors. · Vision. Talk with your doctor about how often to have a glaucoma test. 
· Diabetes. Ask your doctor whether you should have tests for diabetes. · Colon cancer. Your risk for colorectal cancer gets higher as you get older.  Some experts say that adults should start regular screening at age 48 and stop at age 76. Others say to start before age 48 or continue after age 76. Talk with your doctor about your risk and when to start and stop screening. For women · Breast exam and mammogram. Talk to your doctor about when you should have a clinical breast exam and a mammogram. Medical experts differ on whether and how often women under 50 should have these tests. Your doctor can help you decide what is right for you. · Cervical cancer screening test and pelvic exam. Begin with a Pap test at age 24. The test often is part of a pelvic exam. Starting at age 27, you may choose to have a Pap test, an HPV test, or both tests at the same time (called co-testing). Talk with your doctor about how often to have testing. · Tests for sexually transmitted infections (STIs). Ask whether you should have tests for STIs. You may be at risk if you have sex with more than one person, especially if your partners do not wear condoms. For men · Tests for sexually transmitted infections (STIs). Ask whether you should have tests for STIs. You may be at risk if you have sex with more than one person, especially if you do not wear a condom. · Testicular cancer exam. Ask your doctor whether you should check your testicles regularly. · Prostate exam. Talk to your doctor about whether you should have a blood test (called a PSA test) for prostate cancer. Experts differ on whether and when men should have this test. Some experts suggest it if you are older than 39 and are -American or have a father or brother who got prostate cancer when he was younger than 72. When should you call for help? Watch closely for changes in your health, and be sure to contact your doctor if you have any problems or symptoms that concern you. Where can you learn more? Go to http://saray-leydi.info/. Enter P072 in the search box to learn more about \"Well Visit, Ages 25 to 48: Care Instructions. \" 
 Current as of: December 13, 2018 Content Version: 12.2 © 2619-0864 [employer]. Care instructions adapted under license by [employer] (which disclaims liability or warranty for this information). If you have questions about a medical condition or this instruction, always ask your healthcare professional. [address] any warranty or liability for your use of this information. spinal nerve stimulator/Artificial joint/Vascular access device

## 2022-01-18 NOTE — H&P PST ADULT - NSICDXFAMILYHX_GEN_ALL_CORE_FT
FAMILY HISTORY:  FH: heart disease  FH: lupus    Father  Still living? Unknown  FH: heart attack, Age at diagnosis: Age Unknown    Sibling  Still living? Unknown  Family history of AIDS, Age at diagnosis: Age Unknown

## 2022-01-18 NOTE — PATIENT PROFILE ADULT - FALL HARM RISK - HARM RISK INTERVENTIONS

## 2022-01-18 NOTE — H&P PST ADULT - ASSESSMENT
CAPRINI SCORE [CLOT]    AGE RELATED RISK FACTORS                                                       MOBILITY RELATED FACTORS  [ ] Age 41-60 years                                            (1 Point)                  [ ] Bed rest                                                        (1 Point)  [ x] Age: 61-74 years                                           (2 Points)                 [ ] Plaster cast                                                   (2 Points)  [ ] Age= 75 years                                              (3 Points)                   [ ] Bed bound for more than 72 hours                 (2 Points)    DISEASE RELATED RISK FACTORS                                               GENDER SPECIFIC FACTORS  [ x] Edema in the lower extremities                       (1 Point)              [ ] Pregnancy                                                     (1 Point)  [ x] Varicose veins                                               (1 Point)                     [ ] Post-partum < 6 weeks                                   (1 Point)             [ x] BMI > 25 Kg/m2                                            (1 Point)                     [ ] Hormonal therapy  or oral contraception          (1 Point)                 [ ] Sepsis (in the previous month)                        (1 Point)                [ ] History of pregnancy complications                 (1 point)  [ ] Pneumonia or serious lung disease                                                [ ] Unexplained or recurrent                     (1 Point)           (in the previous month)                               (1 Point)  [ ] Abnormal pulmonary function test                     (1 Point)                 SURGERY RELATED RISK FACTORS  [ ] Acute myocardial infarction                              (1 Point)                   [ ]  Section                                             (1 Point)  [ ] Congestive heart failure (in the previous month)  (1 Point)           [ ] Minor surgery                                                  (1 Point)   [ ] Inflammatory bowel disease                             (1 Point)                   [ ] Arthroscopic surgery                                        (2 Points)  [ ] Central venous access                                      (2 Points)                    [ ] General surgery lasting more than 45 minutes   (2 Points)       [ ] Stroke (in the previous month)                          (5 Points)                 [x ] Elective arthroplasty                                         (5 Points)            [ ] Malignacy (past or present)                          (2 ponits)                                                                                                                            HEMATOLOGY RELATED FACTORS                                                 TRAUMA RELATED RISK FACTORS  [ ] Prior episodes of VTE                                     (3 Points)                [ ] Fracture of the hip, pelvis, or leg                       (5 Points)  [ ] Positive family history for VTE                         (3 Points)             [ ] Acute spinal cord injury (in the previous month)  (5 Points)  [ ] Prothrombin 70227 A                                     (3 Points)                [ ] Paralysis  (less than 1 month)                             (5 Points)  [ ] Factor V Leiden                                             (3 Points)                   [ ] Multiple Trauma within 1 month                        (5 Points)  [ ] Lupus anticoagulants                                     (3 Points)                                                           [ ] Anticardiolipin antibodies                               (3 Points)                                                       [ ] High homocysteine in the blood                      (3 Points)                                             [ ] Other congenital or acquired thrombophilia      (3 Points)                                                [ ] Heparin induced thrombocytopenia                  (3 Points)                                          Total Score [ 10         ]    Caprini Score 0 - 2:  Low Risk, No VTE Prophylaxis required for most patients, encourage ambulation  Caprini Score 3 - 6:  At Risk, pharmacologic VTE prophylaxis is indicated for most patients (in the absence of a contraindication)  Caprini Score Greater than or = 7:  High Risk, pharmacologic VTE prophylaxis is indicated for most patients (in the absence of a contraindication)  OPIOID RISK TOOL    CHRIS EACH BOX THAT APPLIES AND ADD TOTALS AT THE END    FAMILY HISTORY OF SUBSTANCE ABUSE                 FEMALE         MALE                                                Alcohol                             [  ]1 pt          [  ]3pts                                               Illegal Durgs                     [  ]2 pts        [  ]3pts                                               Rx Drugs                           [  ]4 pts        [  ]4 pts    PERSONAL HISTORY OF SUBSTANCE ABUSE                                                                                          Alcohol                             [  ]3 pts       [x  ]3 pts                                               Illegal Drugs                     [  ]4 pts        [  ]4 pts                                               Rx Drugs                           [  ]5 pts        [  ]5 pts    AGE BETWEEN 16-45 YEARS                                      [  ]1 pt         [  ]1 pt    HISTORY OF PREADOLESCENT   SEXUAL ABUSE                                                             [  ]3 pts        [  ]0pts    PSYCHOLOGICAL DISEASE                     ADD, OCD, Bipolar, Schizophrenia        [  ]2 pts         [  ]2 pts                      Depression                                               [  ]1 pt           [  ]1 pt           SCORING TOTAL   (add numbers and type here)            ( 3)                              A score of 3 or lower indicated LOW risk for future opioid abuse  A score of 4 to 7 indicated moderate risk for future opioid abuse  A score of 8 or higher indicates a high risk for opioid abuse    71 year old male with h/o Dm, CAD s/p stent on ASA, PAD s/p  femoral artery bypass in 2020, bph, hld,OA, present today for pst with c/o right hip pain. This is a workers comp case related to an incident in  when a washing machine fell onto the patient. His right hip pain is localized to the right groin and is worse with all weightbearing activity. He is now schedule for right total hip replacement with Dr. Pacheco on 2021  Patient educated on written and verbal preop instructions.    Patient verbalized understanding after "teach back" method of instruction were given   Medications reviewed, instructions given on what medications to take and what not to take.  Pt instructed to stop Herbals or anti-inflammatory meds one week prior to surgery and discuss with PMD.

## 2022-01-18 NOTE — H&P PST ADULT - HISTORY OF PRESENT ILLNESS
71 year old male with h/o Dm, CAD s/p stent on ASA, PAD s/p  femoral artery bypass in August 2020, bph, hld,OA, present today for pst with c/o right hip pain. This is a workers comp case related to an incident in 2006 when a washing machine fell onto the patient. His right hip pain is localized to the right groin and is worse with all weightbearing activity.  He admits to worsening stiffness of the hip which is now interfering with ADLs such as putting on socks and shoes. He has not had recent injections into the hip. He has tried therapy without significant improvement. He takes Tylenol and NSAIDs without significant relief.  He is now schedule for right total hip replacement with Dr. Pacheco on 2/7/2021

## 2022-01-18 NOTE — H&P PST ADULT - NSICDXPASTSURGICALHX_GEN_ALL_CORE_FT
PAST SURGICAL HISTORY:  H/O left wrist surgery     H/O total knee replacement, left     S/P CABG x 2     S/P epidural steroid injection     Stented coronary artery      PAST SURGICAL HISTORY:  H/O arthroscopy of knee     H/O arthroscopy of shoulder     H/O left wrist surgery     H/O total knee replacement, left     S/P epidural steroid injection     S/P femoral-femoral bypass surgery     Status post insertion of nerve stimulator     Stented coronary artery x1     PAST SURGICAL HISTORY:  H/O arthroscopy of knee     H/O arthroscopy of shoulder     H/O left wrist surgery     H/O total knee replacement, left     S/P epidural steroid injection     S/P femoral-femoral bypass surgery femoral artery bypass in August 2020    Status post insertion of nerve stimulator     Stented coronary artery x1

## 2022-02-04 ENCOUNTER — OUTPATIENT (OUTPATIENT)
Dept: OUTPATIENT SERVICES | Facility: HOSPITAL | Age: 72
LOS: 1 days | End: 2022-02-04
Payer: COMMERCIAL

## 2022-02-04 ENCOUNTER — OUTPATIENT (OUTPATIENT)
Dept: OUTPATIENT SERVICES | Facility: HOSPITAL | Age: 72
LOS: 1 days | End: 2022-02-04

## 2022-02-04 ENCOUNTER — APPOINTMENT (OUTPATIENT)
Dept: CT IMAGING | Facility: CLINIC | Age: 72
End: 2022-02-04
Payer: OTHER MISCELLANEOUS

## 2022-02-04 DIAGNOSIS — Z95.828 PRESENCE OF OTHER VASCULAR IMPLANTS AND GRAFTS: Chronic | ICD-10-CM

## 2022-02-04 DIAGNOSIS — Z96.652 PRESENCE OF LEFT ARTIFICIAL KNEE JOINT: Chronic | ICD-10-CM

## 2022-02-04 DIAGNOSIS — Z95.5 PRESENCE OF CORONARY ANGIOPLASTY IMPLANT AND GRAFT: Chronic | ICD-10-CM

## 2022-02-04 DIAGNOSIS — Z98.890 OTHER SPECIFIED POSTPROCEDURAL STATES: Chronic | ICD-10-CM

## 2022-02-04 DIAGNOSIS — Z92.241 PERSONAL HISTORY OF SYSTEMIC STEROID THERAPY: Chronic | ICD-10-CM

## 2022-02-04 DIAGNOSIS — Z20.828 CONTACT WITH AND (SUSPECTED) EXPOSURE TO OTHER VIRAL COMMUNICABLE DISEASES: ICD-10-CM

## 2022-02-04 DIAGNOSIS — Z96.82 PRESENCE OF NEUROSTIMULATOR: Chronic | ICD-10-CM

## 2022-02-04 DIAGNOSIS — M16.11 UNILATERAL PRIMARY OSTEOARTHRITIS, RIGHT HIP: ICD-10-CM

## 2022-02-04 PROBLEM — K21.9 GASTRO-ESOPHAGEAL REFLUX DISEASE WITHOUT ESOPHAGITIS: Chronic | Status: ACTIVE | Noted: 2022-01-18

## 2022-02-04 PROBLEM — K27.9 PEPTIC ULCER, SITE UNSPECIFIED, UNSPECIFIED AS ACUTE OR CHRONIC, WITHOUT HEMORRHAGE OR PERFORATION: Chronic | Status: ACTIVE | Noted: 2022-01-18

## 2022-02-04 PROBLEM — E11.9 TYPE 2 DIABETES MELLITUS WITHOUT COMPLICATIONS: Chronic | Status: ACTIVE | Noted: 2022-01-18

## 2022-02-04 PROBLEM — I25.10 ATHEROSCLEROTIC HEART DISEASE OF NATIVE CORONARY ARTERY WITHOUT ANGINA PECTORIS: Chronic | Status: ACTIVE | Noted: 2022-01-18

## 2022-02-04 PROBLEM — N40.0 BENIGN PROSTATIC HYPERPLASIA WITHOUT LOWER URINARY TRACT SYMPTOMS: Chronic | Status: ACTIVE | Noted: 2022-01-18

## 2022-02-04 PROBLEM — E78.5 HYPERLIPIDEMIA, UNSPECIFIED: Chronic | Status: ACTIVE | Noted: 2022-01-18

## 2022-02-04 PROBLEM — J30.2 OTHER SEASONAL ALLERGIC RHINITIS: Chronic | Status: ACTIVE | Noted: 2022-01-18

## 2022-02-04 PROBLEM — M19.90 UNSPECIFIED OSTEOARTHRITIS, UNSPECIFIED SITE: Chronic | Status: ACTIVE | Noted: 2022-01-18

## 2022-02-04 LAB — SARS-COV-2 RNA SPEC QL NAA+PROBE: SIGNIFICANT CHANGE UP

## 2022-02-04 PROCEDURE — 73700 CT LOWER EXTREMITY W/O DYE: CPT | Mod: 26,RT

## 2022-02-04 PROCEDURE — U0003: CPT

## 2022-02-04 PROCEDURE — U0005: CPT

## 2022-02-06 ENCOUNTER — TRANSCRIPTION ENCOUNTER (OUTPATIENT)
Age: 72
End: 2022-02-06

## 2022-02-07 ENCOUNTER — TRANSCRIPTION ENCOUNTER (OUTPATIENT)
Age: 72
End: 2022-02-07

## 2022-02-07 ENCOUNTER — APPOINTMENT (OUTPATIENT)
Dept: ORTHOPEDIC SURGERY | Facility: HOSPITAL | Age: 72
End: 2022-02-07

## 2022-02-07 ENCOUNTER — INPATIENT (INPATIENT)
Facility: HOSPITAL | Age: 72
LOS: 0 days | Discharge: HOME | DRG: 470 | End: 2022-02-08
Attending: ORTHOPAEDIC SURGERY | Admitting: ORTHOPAEDIC SURGERY
Payer: COMMERCIAL

## 2022-02-07 VITALS
SYSTOLIC BLOOD PRESSURE: 148 MMHG | HEIGHT: 69 IN | HEART RATE: 78 BPM | RESPIRATION RATE: 16 BRPM | DIASTOLIC BLOOD PRESSURE: 77 MMHG | WEIGHT: 214.95 LBS | TEMPERATURE: 97 F | OXYGEN SATURATION: 100 %

## 2022-02-07 DIAGNOSIS — M16.11 UNILATERAL PRIMARY OSTEOARTHRITIS, RIGHT HIP: ICD-10-CM

## 2022-02-07 DIAGNOSIS — Z95.828 PRESENCE OF OTHER VASCULAR IMPLANTS AND GRAFTS: Chronic | ICD-10-CM

## 2022-02-07 DIAGNOSIS — Z95.5 PRESENCE OF CORONARY ANGIOPLASTY IMPLANT AND GRAFT: Chronic | ICD-10-CM

## 2022-02-07 DIAGNOSIS — Z96.82 PRESENCE OF NEUROSTIMULATOR: Chronic | ICD-10-CM

## 2022-02-07 DIAGNOSIS — Z98.890 OTHER SPECIFIED POSTPROCEDURAL STATES: Chronic | ICD-10-CM

## 2022-02-07 DIAGNOSIS — Z92.241 PERSONAL HISTORY OF SYSTEMIC STEROID THERAPY: Chronic | ICD-10-CM

## 2022-02-07 DIAGNOSIS — Z96.652 PRESENCE OF LEFT ARTIFICIAL KNEE JOINT: Chronic | ICD-10-CM

## 2022-02-07 LAB
ABO RH CONFIRMATION: SIGNIFICANT CHANGE UP
GLUCOSE BLDC GLUCOMTR-MCNC: 109 MG/DL — HIGH (ref 70–99)
GLUCOSE BLDC GLUCOMTR-MCNC: 80 MG/DL — SIGNIFICANT CHANGE UP (ref 70–99)
GLUCOSE BLDC GLUCOMTR-MCNC: 86 MG/DL — SIGNIFICANT CHANGE UP (ref 70–99)
GLUCOSE BLDC GLUCOMTR-MCNC: 90 MG/DL — SIGNIFICANT CHANGE UP (ref 70–99)
GLUCOSE BLDC GLUCOMTR-MCNC: 95 MG/DL — SIGNIFICANT CHANGE UP (ref 70–99)

## 2022-02-07 PROCEDURE — 73502 X-RAY EXAM HIP UNI 2-3 VIEWS: CPT | Mod: 26

## 2022-02-07 PROCEDURE — 27130 TOTAL HIP ARTHROPLASTY: CPT | Mod: RT

## 2022-02-07 PROCEDURE — 0055T BONE SRGRY CMPTR CT/MRI IMAG: CPT | Mod: RT

## 2022-02-07 PROCEDURE — 0055T BONE SRGRY CMPTR CT/MRI IMAG: CPT | Mod: AS,RT

## 2022-02-07 PROCEDURE — 99222 1ST HOSP IP/OBS MODERATE 55: CPT

## 2022-02-07 PROCEDURE — 27130 TOTAL HIP ARTHROPLASTY: CPT | Mod: AS,RT

## 2022-02-07 DEVICE — CEMENT BONE PALACOS R W/O GENTAMICIN 1X40: Type: IMPLANTABLE DEVICE | Status: FUNCTIONAL

## 2022-02-07 DEVICE — LINER ACET TRIDENT X3 0 DEG 40MM F: Type: IMPLANTABLE DEVICE | Status: FUNCTIONAL

## 2022-02-07 DEVICE — SHELL ACET TRIDENT II F 56MM: Type: IMPLANTABLE DEVICE | Status: FUNCTIONAL

## 2022-02-07 DEVICE — FEM TIB CHECKPOINT STRL: Type: IMPLANTABLE DEVICE | Status: FUNCTIONAL

## 2022-02-07 DEVICE — STEM FEM ACTIS HIGH COLLAR SZ 6: Type: IMPLANTABLE DEVICE | Status: FUNCTIONAL

## 2022-02-07 DEVICE — HEX CHECKPOINT IMPACTION STRL 3.5MM: Type: IMPLANTABLE DEVICE | Status: FUNCTIONAL

## 2022-02-07 DEVICE — HEAD FEM DELTA TS CERAMIC 12/14 40MM PLUS 5MM: Type: IMPLANTABLE DEVICE | Status: FUNCTIONAL

## 2022-02-07 DEVICE — PIN SLFTP STRL 4X170MM: Type: IMPLANTABLE DEVICE | Status: FUNCTIONAL

## 2022-02-07 DEVICE — BONE WAX: Type: IMPLANTABLE DEVICE | Status: FUNCTIONAL

## 2022-02-07 RX ORDER — ATORVASTATIN CALCIUM 80 MG/1
80 TABLET, FILM COATED ORAL AT BEDTIME
Refills: 0 | Status: DISCONTINUED | OUTPATIENT
Start: 2022-02-07 | End: 2022-02-08

## 2022-02-07 RX ORDER — CELECOXIB 200 MG/1
400 CAPSULE ORAL ONCE
Refills: 0 | Status: COMPLETED | OUTPATIENT
Start: 2022-02-07 | End: 2022-02-07

## 2022-02-07 RX ORDER — SEMAGLUTIDE 0.68 MG/ML
0 INJECTION, SOLUTION SUBCUTANEOUS
Qty: 0 | Refills: 0 | DISCHARGE

## 2022-02-07 RX ORDER — ASPIRIN/CALCIUM CARB/MAGNESIUM 324 MG
325 TABLET ORAL
Refills: 0 | Status: DISCONTINUED | OUTPATIENT
Start: 2022-02-08 | End: 2022-02-08

## 2022-02-07 RX ORDER — SODIUM CHLORIDE 9 MG/ML
1000 INJECTION INTRAMUSCULAR; INTRAVENOUS; SUBCUTANEOUS
Refills: 0 | Status: DISCONTINUED | OUTPATIENT
Start: 2022-02-07 | End: 2022-02-08

## 2022-02-07 RX ORDER — DEXTROSE 50 % IN WATER 50 %
25 SYRINGE (ML) INTRAVENOUS ONCE
Refills: 0 | Status: DISCONTINUED | OUTPATIENT
Start: 2022-02-07 | End: 2022-02-08

## 2022-02-07 RX ORDER — CELECOXIB 200 MG/1
200 CAPSULE ORAL EVERY 12 HOURS
Refills: 0 | Status: DISCONTINUED | OUTPATIENT
Start: 2022-02-08 | End: 2022-02-08

## 2022-02-07 RX ORDER — OXYCODONE HYDROCHLORIDE 5 MG/1
0 TABLET ORAL
Qty: 0 | Refills: 0 | DISCHARGE

## 2022-02-07 RX ORDER — ROSUVASTATIN CALCIUM 5 MG/1
1 TABLET ORAL
Qty: 0 | Refills: 0 | DISCHARGE

## 2022-02-07 RX ORDER — OXYCODONE HYDROCHLORIDE 5 MG/1
5 TABLET ORAL
Refills: 0 | Status: DISCONTINUED | OUTPATIENT
Start: 2022-02-07 | End: 2022-02-08

## 2022-02-07 RX ORDER — GLUCAGON INJECTION, SOLUTION 0.5 MG/.1ML
1 INJECTION, SOLUTION SUBCUTANEOUS ONCE
Refills: 0 | Status: DISCONTINUED | OUTPATIENT
Start: 2022-02-07 | End: 2022-02-08

## 2022-02-07 RX ORDER — SODIUM CHLORIDE 9 MG/ML
3 INJECTION INTRAMUSCULAR; INTRAVENOUS; SUBCUTANEOUS EVERY 8 HOURS
Refills: 0 | Status: DISCONTINUED | OUTPATIENT
Start: 2022-02-07 | End: 2022-02-07

## 2022-02-07 RX ORDER — APREPITANT 80 MG/1
40 CAPSULE ORAL ONCE
Refills: 0 | Status: COMPLETED | OUTPATIENT
Start: 2022-02-07 | End: 2022-02-07

## 2022-02-07 RX ORDER — CEFAZOLIN SODIUM 1 G
2000 VIAL (EA) INJECTION
Refills: 0 | Status: COMPLETED | OUTPATIENT
Start: 2022-02-07 | End: 2022-02-08

## 2022-02-07 RX ORDER — ONDANSETRON 8 MG/1
8 TABLET, FILM COATED ORAL ONCE
Refills: 0 | Status: DISCONTINUED | OUTPATIENT
Start: 2022-02-07 | End: 2022-02-08

## 2022-02-07 RX ORDER — SODIUM CHLORIDE 9 MG/ML
500 INJECTION INTRAMUSCULAR; INTRAVENOUS; SUBCUTANEOUS ONCE
Refills: 0 | Status: COMPLETED | OUTPATIENT
Start: 2022-02-07 | End: 2022-02-07

## 2022-02-07 RX ORDER — TAMSULOSIN HYDROCHLORIDE 0.4 MG/1
0.4 CAPSULE ORAL AT BEDTIME
Refills: 0 | Status: DISCONTINUED | OUTPATIENT
Start: 2022-02-07 | End: 2022-02-08

## 2022-02-07 RX ORDER — ACETAMINOPHEN 500 MG
975 TABLET ORAL ONCE
Refills: 0 | Status: DISCONTINUED | OUTPATIENT
Start: 2022-02-07 | End: 2022-02-07

## 2022-02-07 RX ORDER — DEXTROSE 50 % IN WATER 50 %
12.5 SYRINGE (ML) INTRAVENOUS ONCE
Refills: 0 | Status: DISCONTINUED | OUTPATIENT
Start: 2022-02-07 | End: 2022-02-08

## 2022-02-07 RX ORDER — ACETAMINOPHEN 500 MG
975 TABLET ORAL EVERY 8 HOURS
Refills: 0 | Status: DISCONTINUED | OUTPATIENT
Start: 2022-02-07 | End: 2022-02-08

## 2022-02-07 RX ORDER — ONDANSETRON 8 MG/1
4 TABLET, FILM COATED ORAL ONCE
Refills: 0 | Status: DISCONTINUED | OUTPATIENT
Start: 2022-02-07 | End: 2022-02-07

## 2022-02-07 RX ORDER — INSULIN LISPRO 100/ML
VIAL (ML) SUBCUTANEOUS
Refills: 0 | Status: DISCONTINUED | OUTPATIENT
Start: 2022-02-07 | End: 2022-02-08

## 2022-02-07 RX ORDER — PANTOPRAZOLE SODIUM 20 MG/1
40 TABLET, DELAYED RELEASE ORAL
Refills: 0 | Status: DISCONTINUED | OUTPATIENT
Start: 2022-02-07 | End: 2022-02-08

## 2022-02-07 RX ORDER — SENNA PLUS 8.6 MG/1
2 TABLET ORAL AT BEDTIME
Refills: 0 | Status: DISCONTINUED | OUTPATIENT
Start: 2022-02-07 | End: 2022-02-08

## 2022-02-07 RX ORDER — OXYCODONE HYDROCHLORIDE 5 MG/1
10 TABLET ORAL
Refills: 0 | Status: DISCONTINUED | OUTPATIENT
Start: 2022-02-07 | End: 2022-02-08

## 2022-02-07 RX ORDER — FENTANYL CITRATE 50 UG/ML
50 INJECTION INTRAVENOUS
Refills: 0 | Status: DISCONTINUED | OUTPATIENT
Start: 2022-02-07 | End: 2022-02-07

## 2022-02-07 RX ORDER — TAMSULOSIN HYDROCHLORIDE 0.4 MG/1
1 CAPSULE ORAL
Qty: 0 | Refills: 0 | DISCHARGE

## 2022-02-07 RX ORDER — DEXTROSE 50 % IN WATER 50 %
15 SYRINGE (ML) INTRAVENOUS ONCE
Refills: 0 | Status: DISCONTINUED | OUTPATIENT
Start: 2022-02-07 | End: 2022-02-08

## 2022-02-07 RX ORDER — HYDROMORPHONE HYDROCHLORIDE 2 MG/ML
0.5 INJECTION INTRAMUSCULAR; INTRAVENOUS; SUBCUTANEOUS
Refills: 0 | Status: DISCONTINUED | OUTPATIENT
Start: 2022-02-07 | End: 2022-02-08

## 2022-02-07 RX ORDER — MAGNESIUM HYDROXIDE 400 MG/1
30 TABLET, CHEWABLE ORAL DAILY
Refills: 0 | Status: DISCONTINUED | OUTPATIENT
Start: 2022-02-07 | End: 2022-02-08

## 2022-02-07 RX ORDER — EZETIMIBE 10 MG/1
1 TABLET ORAL
Qty: 0 | Refills: 0 | DISCHARGE

## 2022-02-07 RX ORDER — OXYCODONE HYDROCHLORIDE 5 MG/1
5 TABLET ORAL
Refills: 0 | Status: DISCONTINUED | OUTPATIENT
Start: 2022-02-07 | End: 2022-02-07

## 2022-02-07 RX ORDER — SODIUM CHLORIDE 9 MG/ML
1000 INJECTION, SOLUTION INTRAVENOUS
Refills: 0 | Status: DISCONTINUED | OUTPATIENT
Start: 2022-02-07 | End: 2022-02-07

## 2022-02-07 RX ORDER — OXYCODONE HYDROCHLORIDE 5 MG/1
10 TABLET ORAL
Refills: 0 | Status: DISCONTINUED | OUTPATIENT
Start: 2022-02-07 | End: 2022-02-07

## 2022-02-07 RX ORDER — CYCLOBENZAPRINE HYDROCHLORIDE 10 MG/1
10 TABLET, FILM COATED ORAL
Refills: 0 | Status: DISCONTINUED | OUTPATIENT
Start: 2022-02-07 | End: 2022-02-08

## 2022-02-07 RX ORDER — SODIUM CHLORIDE 9 MG/ML
1000 INJECTION, SOLUTION INTRAVENOUS
Refills: 0 | Status: DISCONTINUED | OUTPATIENT
Start: 2022-02-07 | End: 2022-02-08

## 2022-02-07 RX ORDER — PIOGLITAZONE HYDROCHLORIDE 15 MG/1
1 TABLET ORAL
Qty: 0 | Refills: 0 | DISCHARGE

## 2022-02-07 RX ORDER — FENTANYL CITRATE 50 UG/ML
25 INJECTION INTRAVENOUS
Refills: 0 | Status: DISCONTINUED | OUTPATIENT
Start: 2022-02-07 | End: 2022-02-07

## 2022-02-07 RX ORDER — CYCLOBENZAPRINE HYDROCHLORIDE 10 MG/1
0 TABLET, FILM COATED ORAL
Qty: 0 | Refills: 1 | DISCHARGE

## 2022-02-07 RX ORDER — CEFAZOLIN SODIUM 1 G
2000 VIAL (EA) INJECTION ONCE
Refills: 0 | Status: DISCONTINUED | OUTPATIENT
Start: 2022-02-07 | End: 2022-02-07

## 2022-02-07 RX ORDER — KETOROLAC TROMETHAMINE 30 MG/ML
15 SYRINGE (ML) INJECTION EVERY 6 HOURS
Refills: 0 | Status: DISCONTINUED | OUTPATIENT
Start: 2022-02-07 | End: 2022-02-08

## 2022-02-07 RX ORDER — ACETAMINOPHEN 500 MG
975 TABLET ORAL ONCE
Refills: 0 | Status: COMPLETED | OUTPATIENT
Start: 2022-02-07 | End: 2022-02-07

## 2022-02-07 RX ORDER — HYDROMORPHONE HYDROCHLORIDE 2 MG/ML
4 INJECTION INTRAMUSCULAR; INTRAVENOUS; SUBCUTANEOUS
Refills: 0 | Status: DISCONTINUED | OUTPATIENT
Start: 2022-02-07 | End: 2022-02-08

## 2022-02-07 RX ADMIN — Medication 975 MILLIGRAM(S): at 16:00

## 2022-02-07 RX ADMIN — Medication 975 MILLIGRAM(S): at 15:24

## 2022-02-07 RX ADMIN — SODIUM CHLORIDE 1000 MILLILITER(S): 9 INJECTION INTRAMUSCULAR; INTRAVENOUS; SUBCUTANEOUS at 13:00

## 2022-02-07 RX ADMIN — OXYCODONE HYDROCHLORIDE 10 MILLIGRAM(S): 5 TABLET ORAL at 15:24

## 2022-02-07 RX ADMIN — SODIUM CHLORIDE 75 MILLILITER(S): 9 INJECTION INTRAMUSCULAR; INTRAVENOUS; SUBCUTANEOUS at 17:29

## 2022-02-07 RX ADMIN — APREPITANT 40 MILLIGRAM(S): 80 CAPSULE ORAL at 07:08

## 2022-02-07 RX ADMIN — Medication 975 MILLIGRAM(S): at 22:00

## 2022-02-07 RX ADMIN — OXYCODONE HYDROCHLORIDE 10 MILLIGRAM(S): 5 TABLET ORAL at 22:00

## 2022-02-07 RX ADMIN — OXYCODONE HYDROCHLORIDE 10 MILLIGRAM(S): 5 TABLET ORAL at 21:05

## 2022-02-07 RX ADMIN — Medication 975 MILLIGRAM(S): at 07:08

## 2022-02-07 RX ADMIN — Medication 100 MILLIGRAM(S): at 17:30

## 2022-02-07 RX ADMIN — OXYCODONE HYDROCHLORIDE 10 MILLIGRAM(S): 5 TABLET ORAL at 16:00

## 2022-02-07 RX ADMIN — ATORVASTATIN CALCIUM 80 MILLIGRAM(S): 80 TABLET, FILM COATED ORAL at 21:05

## 2022-02-07 RX ADMIN — Medication 15 MILLIGRAM(S): at 17:30

## 2022-02-07 RX ADMIN — Medication 975 MILLIGRAM(S): at 21:04

## 2022-02-07 RX ADMIN — TAMSULOSIN HYDROCHLORIDE 0.4 MILLIGRAM(S): 0.4 CAPSULE ORAL at 21:05

## 2022-02-07 RX ADMIN — SENNA PLUS 2 TABLET(S): 8.6 TABLET ORAL at 21:04

## 2022-02-07 RX ADMIN — Medication 15 MILLIGRAM(S): at 17:36

## 2022-02-07 RX ADMIN — CELECOXIB 400 MILLIGRAM(S): 200 CAPSULE ORAL at 07:08

## 2022-02-07 NOTE — DISCHARGE NOTE PROVIDER - HOSPITAL COURSE
The patient underwent a Right ANTERIOR TOTAL HIP REPLACEMENT on 2/7/22. The patient received antibiotics consistent with SCIP guidelines. The patient underwent the procedure and had no intra-operative complications. Post-operatively, the patient was seen by medicine and PT. The patient received  mg BID for DVTP. The patient received pain medications per orthopedic pain management protocol and the pain was appropriately controlled. Patient was instructed on anterior total hip precautions by PT. The patient did not have any post-operative medical complications. The patient was discharged in stable condition.

## 2022-02-07 NOTE — CONSULT NOTE ADULT - ASSESSMENT
71 year old male with h/o Dm, CAD s/p stent on ASA, PAD s/p  femoral artery bypass in August 2020, bph, hld,  R hip chronic pain . This is a workers comp case related to an incident in 2006 when a washing machine fell onto the patient. His right hip pain is localized to the right groin and is worse with all weightbearing activity.  He admits to worsening stiffness of the hip which is now interfering with ADLs such as putting on socks and shoes. He has not had  injections into the hip. He has tried physical therapy without significant improvement. He takes Tylenol , NSAIDs, Percocet and Xtampza  without significant relief, follows with pain mgmt .    1. R hip chronic pain , s/p work injury 2006 , failed      conservative treatment , now s/p R GIUSEPPE , POD #1   PT/OT/pain mgmt  DVT prophylaxis- as per ortho  Abx as per SCIP  Incentive spirometry  Prophylaxis of opioid  induced constipation.    2. Chronic opioid use - follows with pain mgmt as on outpatient -   consider pain mgmt eval     3. Hx of CAD with stent placement ( 8/21) -   continue ASA/ statins , not on BB     4. DM - type 2 - not on Insulin - hold oral hypoglycemics for now ,   restart on d/c Home , ADA , ISSC, accu check , last A1C 1/22 - 5.8 -   well controlled DM     5. Hx of being unable to empty bladder , likely due to BPH - on Flomax ,   continue and observe post op for urinary retention     6. Patient  states he has "likely peptic ulcer" but never had EGD in the past -   he is on PPI - continue post op     7 . Hx of PVD - S/P R femoral bypass 8/20 - continue ASA / statins     8. DVT prophylaxis  - as per ortho protocol  Opioid induced constipation  prophylaxis - bowel regimen     Thank you for the courtesy of this consult ,   will follow .

## 2022-02-07 NOTE — DISCHARGE NOTE PROVIDER - NSDCFUSCHEDAPPT_GEN_ALL_CORE_FT
BARAK DUBON ; 03/01/2022 ; [facility]  BARAK DUBON ; 03/22/2022 ; [facility]

## 2022-02-07 NOTE — DISCHARGE NOTE PROVIDER - NSDCCPCAREPLAN_GEN_ALL_CORE_FT
PRINCIPAL DISCHARGE DIAGNOSIS  Diagnosis: Primary localized osteoarthrosis of right hip  Assessment and Plan of Treatment:

## 2022-02-07 NOTE — CONSULT NOTE ADULT - SUBJECTIVE AND OBJECTIVE BOX
Patient is a 71y old  Male who is s/p R GIUSEPPE , POD # 0 . Seen and examined on PACU , tolerated procedure well .     CC: Chronic R hip pain       HPI:  71 year old male with h/o Dm, CAD s/p stent on ASA, PAD s/p  femoral artery bypass in August 2020, bph, hld,  R hip chronic pain . This is a workers comp case related to an incident in 2006 when a washing machine fell onto the patient. His right hip pain is localized to the right groin and is worse with all weightbearing activity.  He admits to worsening stiffness of the hip which is now interfering with ADLs such as putting on socks and shoes. He has not had  injections into the hip. He has tried physical therapy without significant improvement. He takes Tylenol , NSAIDs, Percocet and Xtampza  without significant relief, follows with pain mgmt .      PAST MEDICAL & SURGICAL HISTORY:  OA (osteoarthritis)    Diabetes mellitus- type 2    BPH without urinary obstruction    HLD (hyperlipidemia)    GERD (gastroesophageal reflux disease)    Seasonal allergies    CAD (coronary artery disease)    H/O total knee replacement, left    S/P epidural steroid injection    Stented coronary artery  x1    H/O left wrist surgery    S/P femoral-femoral bypass surgery  femoral artery bypass in August 2020    H/O arthroscopy of knee    H/O arthroscopy of shoulder    Status post insertion of nerve stimulator        Social History:  Tobacco - occasionally smokes one cigar , last   cigar 3 wks ago   ETOH - was drinking moderate in the past , quit 20 yrs ago   Illicit drug abuse - denies    FAMILY HISTORY:  FH: heart attack (Father)    FH: lupus    FH: heart disease    Family history of AIDS (Sibling)        Allergies    No Known Allergies    Intolerances        HOME MEDICATIONS :   · 	CYCLOBENZAPRINE 10MG TABLETS: Last Dose Taken:  , TAKE 1 TABLET BY MOUTH TWICE DAILY AS NEEDED    FOR SPASMS  · 	XTAMPZA ER 13.5MG CAPSULES: Last Dose Taken:  , TAKE 1 CAPSULE BY MOUTH TWICE DAILY FOR PAIN  · 	OZEMPIC 1MG PEN: Last Dose Taken:    · 	EZETIMIBE 10MG TAB: Last Dose Taken:  , 1 tab(s) orally once a day  · 	PIOGLITAZONE HCL 45 MG TABLET: Last Dose Taken:  , 1 each orally once a day  · 	ROSUVASTATIN CALCIUM 40MG TAB: Last Dose Taken:  , 1 tab(s) orally once a day  · 	TAMSULOSIN HYDROCHLORIDE 0.4MG CAP: Last Dose Taken:  , 1 cap(s) orally once a day  · 	aspirin 81 mg oral tablet: Last Dose Taken:  , 1  orally once a day    REVIEW OF SYSTEMS:    R hip chronic pain , all other systems are reviewed and are negative     MEDICATIONS  (STANDING):  acetaminophen     Tablet .. 975 milliGRAM(s) Oral every 8 hours  atorvastatin 80 milliGRAM(s) Oral at bedtime  ceFAZolin   IVPB 2000 milliGRAM(s) IV Intermittent <User Schedule>  dextrose 40% Gel 15 Gram(s) Oral once  dextrose 5%. 1000 milliLiter(s) (50 mL/Hr) IV Continuous <Continuous>  dextrose 5%. 1000 milliLiter(s) (100 mL/Hr) IV Continuous <Continuous>  dextrose 50% Injectable 25 Gram(s) IV Push once  dextrose 50% Injectable 12.5 Gram(s) IV Push once  dextrose 50% Injectable 25 Gram(s) IV Push once  glucagon  Injectable 1 milliGRAM(s) IntraMuscular once  insulin lispro (ADMELOG) corrective regimen sliding scale   SubCutaneous three times a day before meals  ketorolac   Injectable 15 milliGRAM(s) IV Push every 6 hours  lactated ringers. 1000 milliLiter(s) (100 mL/Hr) IV Continuous <Continuous>  ondansetron Injectable 8 milliGRAM(s) IV Push once  pantoprazole    Tablet 40 milliGRAM(s) Oral before breakfast  senna 2 Tablet(s) Oral at bedtime  sodium chloride 0.9% Bolus 500 milliLiter(s) IV Bolus once  sodium chloride 0.9%. 1000 milliLiter(s) (75 mL/Hr) IV Continuous <Continuous>  tamsulosin 0.4 milliGRAM(s) Oral at bedtime    MEDICATIONS  (PRN):  cyclobenzaprine 10 milliGRAM(s) Oral two times a day PRN Muscle Spasm  fentaNYL    Injectable 50 MICROGram(s) IV Push every 5 minutes PRN Severe Pain (7 - 10)  fentaNYL    Injectable 25 MICROGram(s) IV Push every 5 minutes PRN Moderate Pain (4 - 6)  HYDROmorphone   Tablet 4 milliGRAM(s) Oral every 3 hours PRN Severe Pain (7 - 10)  HYDROmorphone  Injectable 0.5 milliGRAM(s) IV Push every 3 hours PRN breakthrough pain  magnesium hydroxide Suspension 30 milliLiter(s) Oral daily PRN Constipation  ondansetron Injectable 4 milliGRAM(s) IV Push once PRN Nausea and/or Vomiting  oxyCODONE    IR 5 milliGRAM(s) Oral every 3 hours PRN Moderate Pain (4 - 6)  oxyCODONE    IR 10 milliGRAM(s) Oral every 3 hours PRN Severe Pain (7 - 10)      Vital Signs Last 24 Hrs  T(C): 36.6 (07 Feb 2022 13:00), Max: 36.6 (07 Feb 2022 12:10)  T(F): 97.9 (07 Feb 2022 13:00), Max: 97.9 (07 Feb 2022 12:10)  HR: 62 (07 Feb 2022 13:00) (59 - 78)  BP: 146/70 (07 Feb 2022 13:00) (102/56 - 148/77)  BP(mean): 88 (07 Feb 2022 13:00) (67 - 88)  RR: 14 (07 Feb 2022 13:00) (12 - 16)  SpO2: 100% (07 Feb 2022 13:00) (100% - 100%)    PHYSICAL EXAM:    GENERAL: NAD, well-groomed, well-developed  HEAD:  Atraumatic, Normocephalic  EYES: EOMI, PERRLA, conjunctiva and sclera clear  NECK: Supple, No JVD, Normal thyroid  NERVOUS SYSTEM:  Alert & Oriented X4 , no focal deficit   CHEST/LUNG: CTA  b/l,  no rales, rhonchi, wheezing, or rubs  HEART: Regular rate and rhythm; No murmurs, rubs, or gallops  ABDOMEN: Soft, Nontender, Nondistended; Bowel sounds present  EXTREMITIES:  2+ Peripheral Pulses, No clubbing, cyanosis, or edema ,   R hip dressing + , clean and dry   LYMPH: No lymphadenopathy noted  SKIN: No rashes or lesions    LABS:  Pending     RADIOLOGY & ADDITIONAL STUDIES:    < from: CT Hip No Cont, Right (02.04.22 @ 10:15) >  EXAM: [id number] - CT HIP ONLY RT  - ORDERED BY: ISAIAH PEÑA      PROCEDURE DATE:  02/04/2022           INTERPRETATION:  CT HIP RIGHT dated 2/4/2022 10:15 AM    INDICATION: Right hip pain    COMPARISON: None available.    < end of copied text >  < from: CT Hip No Cont, Right (02.04.22 @ 10:15) >    IMPRESSION:    Severe right and moderate left hip arthrosis.  Degenerative changes of the knees    --- End of Report ---    < end of copied text >

## 2022-02-07 NOTE — PHYSICAL THERAPY INITIAL EVALUATION ADULT - ADDITIONAL COMMENTS
pt reports he lives in a condo with 1 little step to enter with one handrails and 0 steps inside. pt reports independence with all ADLs, use of SAC and driving prior to admission. pt owns a RW. pt states wife can assist as needed.

## 2022-02-07 NOTE — DISCHARGE NOTE PROVIDER - NSDCMRMEDTOKEN_GEN_ALL_CORE_FT
aspirin 81 mg oral tablet: 1  orally once a day  CYCLOBENZAPRINE 10MG TABLETS: TAKE 1 TABLET BY MOUTH TWICE DAILY AS NEEDED FOR SPASMS  EZETIMIBE 10MG TAB: 1 tab(s) orally once a day  OZEMPIC 1MG PEN:   PIOGLITAZONE HCL 45 MG TABLET: 1 each orally once a day  ROSUVASTATIN CALCIUM 40MG TAB: 1 tab(s) orally once a day  TAMSULOSIN HYDROCHLORIDE 0.4MG CAP: 1 cap(s) orally once a day  XTAMPZA ER 13.5MG CAPSULES: TAKE 1 CAPSULE BY MOUTH TWICE DAILY FOR PAIN   aspirin 325 mg oral tablet: 1 tab(s) orally 2 times a day  celecoxib 200 mg oral capsule: 1 cap(s) orally every 12 hours  CYCLOBENZAPRINE 10MG TABLETS: TAKE 1 TABLET BY MOUTH TWICE DAILY AS NEEDED FOR SPASMS  EZETIMIBE 10MG TAB: 1 tab(s) orally once a day  oxyCODONE 5 mg oral tablet: 1 tab(s) orally every 4 to 6 hours, As Needed  Pain  MDD:6  OZEMPIC 1MG PEN:   pantoprazole 40 mg oral delayed release tablet: 1 tab(s) orally once a day (before a meal)  PIOGLITAZONE HCL 45 MG TABLET: 1 each orally once a day  ROSUVASTATIN CALCIUM 40MG TAB: 1 tab(s) orally once a day  Senna S 50 mg-8.6 mg oral tablet: 2 tab(s) orally once a day (at bedtime)   TAMSULOSIN HYDROCHLORIDE 0.4MG CAP: 1 cap(s) orally once a day  XTAMPZA ER 13.5MG CAPSULES: TAKE 1 CAPSULE BY MOUTH TWICE DAILY FOR PAIN

## 2022-02-07 NOTE — DISCHARGE NOTE PROVIDER - CARE PROVIDER_API CALL
Vu Pacheco)  Orthopaedic Surgery  [address], [address]  [place] [address]  Phone: [phone]  Fax: [phone]  Follow Up Time:

## 2022-02-07 NOTE — DISCHARGE NOTE PROVIDER - NSDCFUADDINST_GEN_ALL_CORE_FT
The patient will be seen in the office between 2-3 weeks for wound check.   **Your first post-operative visit has been scheduled prior to your admission. PLEASE CONTACT OFFICE TO CONFIRM THE APPOINTMENT DATE. Tape will be removed at that time.  **  The silver based dressing is to be removed 7 days from the date of surgery.   ** CONTACT THE OFFICE IF THE FOLLOWING DEVELOP:  - the dressing becomes soiled or saturated  - you develop a fever greater that 101F  - the wound becomes red or you develop blistering around the wound  * Patient may shower after post-op day #3.   * The patient will continue home PT consistent with anterior total hip replacement protocol and will continue to practice anterior total hip precautions for a minimum of 6 week. Transition to outpatient PT will occur at the time of the first office visit.   * The patient is FULL weight bearing.  * The patient will continue ASPIRIN 325 mg twice daily  for 6 weeks after surgery for blood clot prevention.  * The patient will take OXYCODONE AND TYLENOL for pain control and adjust according to prescription and patient needs. Contact the office if pain increases while taking prescribed pain medications or related concerns develop.   * Celebrex will be taken twice daily for 3 weeks for pain control and prevention of excessive bone growth. Additional prescription may be requested at your office follow-up visit.  * The patient will take Senna S while taking oxycodone to prevent narcotic associated constipation.  Additionally, increase water intake (drink at least 8 glasses of water daily) and try adding fiber to the diet by eating fruits, vegetables and foods that are rich in grains. If constipation is experienced, contact the medical/primary care provider to discuss further treatment options.  * To avoid injury at home:  - continue use of rolling walker until cleared by physical therapist  - have family or friend remove all throw rug or objects in hallways that may present a trip hazard.  - if you experience any dizziness or medical concerns, call your medical doctor or  911.  * The implant may activate metal detection devices.

## 2022-02-07 NOTE — BRIEF OPERATIVE NOTE - NSICDXBRIEFPREOP_GEN_ALL_CORE_FT
PRE-OP DIAGNOSIS:  Primary localized osteoarthrosis of right hip 07-Feb-2022 11:41:48  Jose Jernigan

## 2022-02-07 NOTE — DISCHARGE NOTE NURSING/CASE MANAGEMENT/SOCIAL WORK - NSDCPEFALRISK_GEN_ALL_CORE
For information on Fall & Injury Prevention, visit: [email or web address] OR  [email or web address] OR  https://www.cdc.gov/steadi/patient.html

## 2022-02-07 NOTE — DISCHARGE NOTE NURSING/CASE MANAGEMENT/SOCIAL WORK - PATIENT PORTAL LINK FT
You can access the FollowMyHealth Patient Portal offered by [facility] by registering at the following website: [email or web address]. By joining [facility]’s FollowMyHealth portal, you will also be able to view your health information using other applications (apps) compatible with our system.

## 2022-02-07 NOTE — PHYSICAL THERAPY INITIAL EVALUATION ADULT - DIAGNOSIS, PT EVAL
pt demonstrates decreased functional mobility due to decreased strength and balance secondary to surgical procedure.

## 2022-02-08 VITALS
OXYGEN SATURATION: 96 % | SYSTOLIC BLOOD PRESSURE: 110 MMHG | DIASTOLIC BLOOD PRESSURE: 64 MMHG | HEART RATE: 87 BPM | RESPIRATION RATE: 18 BRPM | TEMPERATURE: 98 F

## 2022-02-08 LAB
ANION GAP SERPL CALC-SCNC: 11 MMOL/L — SIGNIFICANT CHANGE UP (ref 5–17)
BUN SERPL-MCNC: 13.4 MG/DL — SIGNIFICANT CHANGE UP (ref 8–20)
CALCIUM SERPL-MCNC: 8.6 MG/DL — SIGNIFICANT CHANGE UP (ref 8.6–10.2)
CHLORIDE SERPL-SCNC: 106 MMOL/L — SIGNIFICANT CHANGE UP (ref 98–107)
CO2 SERPL-SCNC: 21 MMOL/L — LOW (ref 22–29)
CREAT SERPL-MCNC: 0.66 MG/DL — SIGNIFICANT CHANGE UP (ref 0.5–1.3)
GLUCOSE BLDC GLUCOMTR-MCNC: 115 MG/DL — HIGH (ref 70–99)
GLUCOSE SERPL-MCNC: 99 MG/DL — SIGNIFICANT CHANGE UP (ref 70–99)
HCT VFR BLD CALC: 37.3 % — LOW (ref 39–50)
HGB BLD-MCNC: 12 G/DL — LOW (ref 13–17)
MCHC RBC-ENTMCNC: 29.4 PG — SIGNIFICANT CHANGE UP (ref 27–34)
MCHC RBC-ENTMCNC: 32.2 GM/DL — SIGNIFICANT CHANGE UP (ref 32–36)
MCV RBC AUTO: 91.4 FL — SIGNIFICANT CHANGE UP (ref 80–100)
PLATELET # BLD AUTO: 170 K/UL — SIGNIFICANT CHANGE UP (ref 150–400)
POTASSIUM SERPL-MCNC: 4.2 MMOL/L — SIGNIFICANT CHANGE UP (ref 3.5–5.3)
POTASSIUM SERPL-SCNC: 4.2 MMOL/L — SIGNIFICANT CHANGE UP (ref 3.5–5.3)
RBC # BLD: 4.08 M/UL — LOW (ref 4.2–5.8)
RBC # FLD: 14.9 % — HIGH (ref 10.3–14.5)
SODIUM SERPL-SCNC: 138 MMOL/L — SIGNIFICANT CHANGE UP (ref 135–145)
WBC # BLD: 8.86 K/UL — SIGNIFICANT CHANGE UP (ref 3.8–10.5)
WBC # FLD AUTO: 8.86 K/UL — SIGNIFICANT CHANGE UP (ref 3.8–10.5)

## 2022-02-08 PROCEDURE — 99232 SBSQ HOSP IP/OBS MODERATE 35: CPT

## 2022-02-08 RX ORDER — SENNOSIDES/DOCUSATE SODIUM 8.6MG-50MG
2 TABLET ORAL
Qty: 30 | Refills: 0
Start: 2022-02-08

## 2022-02-08 RX ORDER — ASPIRIN/CALCIUM CARB/MAGNESIUM 324 MG
1 TABLET ORAL
Qty: 84 | Refills: 0
Start: 2022-02-08

## 2022-02-08 RX ORDER — OXYCODONE HYDROCHLORIDE 5 MG/1
1 TABLET ORAL
Qty: 30 | Refills: 0
Start: 2022-02-08

## 2022-02-08 RX ORDER — PANTOPRAZOLE SODIUM 20 MG/1
1 TABLET, DELAYED RELEASE ORAL
Qty: 30 | Refills: 2
Start: 2022-02-08 | End: 2022-05-08

## 2022-02-08 RX ORDER — ASPIRIN/CALCIUM CARB/MAGNESIUM 324 MG
1 TABLET ORAL
Qty: 0 | Refills: 0 | DISCHARGE

## 2022-02-08 RX ADMIN — Medication 325 MILLIGRAM(S): at 04:25

## 2022-02-08 RX ADMIN — Medication 15 MILLIGRAM(S): at 05:15

## 2022-02-08 RX ADMIN — Medication 975 MILLIGRAM(S): at 04:25

## 2022-02-08 RX ADMIN — Medication 15 MILLIGRAM(S): at 00:03

## 2022-02-08 RX ADMIN — OXYCODONE HYDROCHLORIDE 10 MILLIGRAM(S): 5 TABLET ORAL at 13:15

## 2022-02-08 RX ADMIN — Medication 15 MILLIGRAM(S): at 04:24

## 2022-02-08 RX ADMIN — Medication 100 MILLIGRAM(S): at 00:03

## 2022-02-08 RX ADMIN — Medication 975 MILLIGRAM(S): at 05:15

## 2022-02-08 RX ADMIN — OXYCODONE HYDROCHLORIDE 10 MILLIGRAM(S): 5 TABLET ORAL at 05:15

## 2022-02-08 RX ADMIN — OXYCODONE HYDROCHLORIDE 10 MILLIGRAM(S): 5 TABLET ORAL at 12:42

## 2022-02-08 RX ADMIN — OXYCODONE HYDROCHLORIDE 10 MILLIGRAM(S): 5 TABLET ORAL at 04:24

## 2022-02-08 RX ADMIN — Medication 15 MILLIGRAM(S): at 00:04

## 2022-02-08 RX ADMIN — PANTOPRAZOLE SODIUM 40 MILLIGRAM(S): 20 TABLET, DELAYED RELEASE ORAL at 04:25

## 2022-02-08 NOTE — PROGRESS NOTE ADULT - ASSESSMENT
71 year old male with h/o Dm, CAD s/p stent on ASA, PAD s/p  femoral artery bypass in August 2020, bph, hld,  R hip chronic pain . This is a workers comp case related to an incident in 2006 when a washing machine fell onto the patient. His right hip pain is localized to the right groin and is worse with all weightbearing activity.  He admits to worsening stiffness of the hip which is now interfering with ADLs such as putting on socks and shoes. He has not had  injections into the hip. He has tried physical therapy without significant improvement. He takes Tylenol , NSAIDs, Percocet and Xtampza  without significant relief, follows with pain mgmt .    1. R hip chronic pain , s/p work injury 2006 , failed      conservative treatment , now s/p R GIUSEPPE , POD #1   PT/OT/pain mgmt  DVT prophylaxis- as per ortho  Abx as per SCIP- given   Incentive spirometry  Prophylaxis of opioid  induced constipation.  Wound care and outpatient follow up as per ortho     2. Chronic opioid use - follows with pain mgmt as on outpatient -   consider pain mgmt eval     3. Hx of CAD with stent placement ( 8/21) -   continue ASA/ statins , not on BB     4. DM - type 2 - not on Insulin - hold oral hypoglycemics for now ,   restart on d/c Home , ADA , ISSC, accu check , last A1C 1/22 - 5.8 -   well controlled DM     5. Hx of being unable to empty bladder , likely due to BPH - on Flomax ,   continue and observe post op for urinary retention - voiding with out difficulty     6. Patient  states he has "likely peptic ulcer" but never had EGD in the past -   he is on Pepcid  at home . This am c/o " heart burn ' earlier this am , states same   as he is already experiencing. Advised to not taker meds on empty stomach  ,   keep head elevated , continue Pepcid at night and add Protonix in am while on ASA BID .   Advised to see GI for possible EGD in near future     7 . Hx of PVD - S/P R femoral bypass 8/20 - continue ASA / statins     8. DVT prophylaxis  - as per ortho protocol- on ASA bid   Opioid induced constipation  prophylaxis - bowel regimen     9. Anemia - acute blood lost anemia - secondary to surgical blood lost - patient is asymptomatic     Dispo plan is Home - likely in am   d/w ortho PA/ nurse / CM .    Medically stable to d/c once cleared by physical therapy / ortho .

## 2022-02-08 NOTE — PROGRESS NOTE ADULT - SUBJECTIVE AND OBJECTIVE BOX
Ortho Post Op Check    Name: BARAK DUBON  MR #: [id number]    Procedure: right total hip arthroplasty   Surgeon: Tara    Pt comfortable without complaints, pain controlled. +tingling to right foot  Denies CP, SOB, N/V, numbness.    General Exam:  Vital Signs Last 24 Hrs  T(C): 36.6 (02-07-22 @ 13:00), Max: 36.6 (02-07-22 @ 12:10)  T(F): 97.9 (02-07-22 @ 13:00), Max: 97.9 (02-07-22 @ 12:10)  HR: 68 (02-07-22 @ 13:30) (59 - 68)  BP: 141/65 (02-07-22 @ 13:30) (102/56 - 146/70)  BP(mean): 85 (02-07-22 @ 13:30) (67 - 88)  RR: 15 (02-07-22 @ 13:30) (12 - 15)  SpO2: 100% (02-07-22 @ 13:30) (100% - 100%)    General: Pt Alert and oriented, NAD, controlled pain.  Dressings C/D/I. No bleeding.  Pulses: 2+ dorsalis pedis pulse. Cap refill < 2 sec.  Sensation: Grossly intact to light touch without deficit.  Motor: + EHL/FHL/TA/GS        A/P: 71yMale POD#0 s/p right total hip arthroplasty     - Pain Control  - DVT ppx:  BID  - Post op abx: as per SCIP  - PT eval pending  - Weight bearing status: WBAT  - anterior precautions reviewed
Patient seen and examined . S/p  R GIUSEPPE , POD # 1. Pain well controlled , no n/v , voiding without difficulty ,   participating with physical therapy . C/O " heart burn " - chronic , same episodes in the past     CC : R hip chronic pain postop well controlled with prescribed pain medications ,   c/o " heart burn" earler this am after taking PO meds on empty stomach         MEDICATIONS  (STANDING):  acetaminophen     Tablet .. 975 milliGRAM(s) Oral every 8 hours  aspirin 325 milliGRAM(s) Oral two times a day  atorvastatin 80 milliGRAM(s) Oral at bedtime  celecoxib 200 milliGRAM(s) Oral every 12 hours  dextrose 40% Gel 15 Gram(s) Oral once  dextrose 5%. 1000 milliLiter(s) (50 mL/Hr) IV Continuous <Continuous>  dextrose 5%. 1000 milliLiter(s) (100 mL/Hr) IV Continuous <Continuous>  dextrose 50% Injectable 25 Gram(s) IV Push once  dextrose 50% Injectable 12.5 Gram(s) IV Push once  dextrose 50% Injectable 25 Gram(s) IV Push once  glucagon  Injectable 1 milliGRAM(s) IntraMuscular once  insulin lispro (ADMELOG) corrective regimen sliding scale   SubCutaneous three times a day before meals  ketorolac   Injectable 15 milliGRAM(s) IV Push every 6 hours  ondansetron Injectable 8 milliGRAM(s) IV Push once  pantoprazole    Tablet 40 milliGRAM(s) Oral before breakfast  senna 2 Tablet(s) Oral at bedtime  sodium chloride 0.9%. 1000 milliLiter(s) (75 mL/Hr) IV Continuous <Continuous>  tamsulosin 0.4 milliGRAM(s) Oral at bedtime    MEDICATIONS  (PRN):  cyclobenzaprine 10 milliGRAM(s) Oral two times a day PRN Muscle Spasm  HYDROmorphone   Tablet 4 milliGRAM(s) Oral every 3 hours PRN Severe Pain (7 - 10)  HYDROmorphone  Injectable 0.5 milliGRAM(s) IV Push every 3 hours PRN breakthrough pain  magnesium hydroxide Suspension 30 milliLiter(s) Oral daily PRN Constipation  oxyCODONE    IR 5 milliGRAM(s) Oral every 3 hours PRN Mild Pain (1 - 3)  oxyCODONE    IR 10 milliGRAM(s) Oral every 3 hours PRN Moderate Pain (4 - 6)      LABS:                          12.0   8.86  )-----------( 170      ( 08 Feb 2022 06:59 )             37.3     02-08    138  |  106  |  13.4  ----------------------------<  99  4.2   |  21.0<L>  |  0.66    Ca    8.6      08 Feb 2022 06:59      RADIOLOGY & ADDITIONAL TESTS:        REVIEW OF SYSTEMS:    As above , all other systems are reviewed and are negative     Vital Signs Last 24 Hrs  T(C): 36.7 (08 Feb 2022 08:06), Max: 36.7 (07 Feb 2022 23:27)  T(F): 98 (08 Feb 2022 08:06), Max: 98.1 (07 Feb 2022 23:27)  HR: 100 (08 Feb 2022 08:06) (59 - 100)  BP: 108/61 (08 Feb 2022 08:06) (102/56 - 146/70)  BP(mean): 85 (07 Feb 2022 13:30) (67 - 88)  RR: 18 (08 Feb 2022 08:06) (12 - 18)  SpO2: 93% (08 Feb 2022 08:06) (93% - 100%)    PHYSICAL EXAM:    GENERAL: NAD, well-groomed, well-developed  HEAD:  Atraumatic, Normocephalic  EYES: EOMI, PERRLA, conjunctiva and sclera clear  NECK: Supple, No JVD, Normal thyroid  NERVOUS SYSTEM:  Alert & Oriented X3, no focal deficit  CHEST/LUNG: CTA b/l ,  no  rales, rhonchi, wheezing, or rubs  HEART: Regular rate and rhythm; No murmurs, rubs, or gallops  ABDOMEN: Soft, Nontender, Nondistended; Bowel sounds present  EXTREMITIES:  2+ Peripheral Pulses, No clubbing, cyanosis, or edema,   R hip dressing + , clean and dry   LYMPH: No lymphadenopathy noted  SKIN: No rashes or lesions  
BARAK JAGDISH    [id number]    History: Patient is status post right anterior total hip arthroplasty on 2/7/2022, POD #1.  Patient is doing well and is comfortable.  The patient's pain is controlled using the prescribed pain medications. The patient is participating in physical therapy. Denies nausea, vomiting, chest pain, shortness of breath, abdominal pain or fever. No new complaints.                              12.0   8.86  )-----------( 170      ( 08 Feb 2022 06:59 )             37.3         Physical exam: The right hip dressing is clean, dry and intact. No drainage or discharge. No erythema is noted. No blistering. No ecchymosis.  The calf is supple nontender.  No calf tenderness. Sensation to light touch is grossly intact distally. The lateral cutaneous nerve is intact. Motor function distally is 5/5. No foot drop. 2+ dorsalis pedis pulse. Capillary refill is less than 2 seconds. No cyanosis.    Primary Orthopedic Assessment:  • s/p RIGHT ANTERIOR total hip replacement pod #1        Plan:   • DVT prophylaxis as prescribed, including use of compression devices and ankle pumps  • Continue physical therapy  • Weightbearing as tolerated of the right lower extremity with assistance of a walker  • Incentive spirometry encouraged  • Pain control as clinically indicated  • Anterior hip precautions reviewed with patient  • Discharge planning – anticipated discharge is Home today   
Pelvis & hip films reviewed. Implants are in appropriate position. No fracture or dislocation noted. Patient is WBAT of the surgical extremity.

## 2022-02-09 RX ORDER — CELECOXIB 200 MG/1
1 CAPSULE ORAL
Qty: 42 | Refills: 0
Start: 2022-02-09

## 2022-02-15 ENCOUNTER — APPOINTMENT (OUTPATIENT)
Dept: ULTRASOUND IMAGING | Facility: CLINIC | Age: 72
End: 2022-02-15

## 2022-02-15 ENCOUNTER — OUTPATIENT (OUTPATIENT)
Dept: OUTPATIENT SERVICES | Facility: HOSPITAL | Age: 72
LOS: 1 days | End: 2022-02-15

## 2022-02-15 DIAGNOSIS — M16.11 UNILATERAL PRIMARY OSTEOARTHRITIS, RIGHT HIP: ICD-10-CM

## 2022-02-15 DIAGNOSIS — Z98.890 OTHER SPECIFIED POSTPROCEDURAL STATES: Chronic | ICD-10-CM

## 2022-02-15 DIAGNOSIS — M79.89 OTHER SPECIFIED SOFT TISSUE DISORDERS: ICD-10-CM

## 2022-02-15 DIAGNOSIS — Z96.652 PRESENCE OF LEFT ARTIFICIAL KNEE JOINT: Chronic | ICD-10-CM

## 2022-02-15 DIAGNOSIS — Z95.5 PRESENCE OF CORONARY ANGIOPLASTY IMPLANT AND GRAFT: Chronic | ICD-10-CM

## 2022-02-15 DIAGNOSIS — Z92.241 PERSONAL HISTORY OF SYSTEMIC STEROID THERAPY: Chronic | ICD-10-CM

## 2022-02-15 DIAGNOSIS — Z96.82 PRESENCE OF NEUROSTIMULATOR: Chronic | ICD-10-CM

## 2022-02-15 DIAGNOSIS — Z95.828 PRESENCE OF OTHER VASCULAR IMPLANTS AND GRAFTS: Chronic | ICD-10-CM

## 2022-03-01 ENCOUNTER — APPOINTMENT (OUTPATIENT)
Dept: ORTHOPEDIC SURGERY | Facility: CLINIC | Age: 72
End: 2022-03-01
Payer: OTHER MISCELLANEOUS

## 2022-03-01 VITALS
BODY MASS INDEX: 30.81 KG/M2 | DIASTOLIC BLOOD PRESSURE: 67 MMHG | WEIGHT: 208 LBS | HEIGHT: 69 IN | SYSTOLIC BLOOD PRESSURE: 143 MMHG

## 2022-03-01 DIAGNOSIS — Z96.641 PRESENCE OF RIGHT ARTIFICIAL HIP JOINT: ICD-10-CM

## 2022-03-01 DIAGNOSIS — Z47.1 AFTERCARE FOLLOWING JOINT REPLACEMENT SURGERY: ICD-10-CM

## 2022-03-01 DIAGNOSIS — Z96.649 AFTERCARE FOLLOWING JOINT REPLACEMENT SURGERY: ICD-10-CM

## 2022-03-01 PROCEDURE — 99024 POSTOP FOLLOW-UP VISIT: CPT

## 2022-03-01 PROCEDURE — 73502 X-RAY EXAM HIP UNI 2-3 VIEWS: CPT | Mod: RT

## 2022-03-01 NOTE — HISTORY OF PRESENT ILLNESS
[de-identified] : S/p Robotic assisted anterior THR with DIANA DOS:02/07/22\par \par  [de-identified] : BARAK DUBON is doing well 3 week s/p right total hip replacement. He is participating in home physical therapy, as well as a home exercise program daily. His pain level is controlled. He is taking aspirin 325 mg twice a day for DVT prophylaxis. The patient ambulates with a walker.  Overall, he is very happy with the results of the surgery so far.		  [de-identified] : Exam of the right hip and contralateral iliac crest shows well healing incisions, patient can perform a straight leg raise.		 \par 5/5 motor strength bilaterally distally. Sensation intact distally.		  [de-identified] :  X-ray: AP of the pelvis and 2 views of the right hip demonstrate a right total hip arthroplasty in stable position, with no evidence of fracture, loosening, or dislocation.		  [de-identified] : The patient is doing very well 3 weeks after right anterior total hip replacement. The patient will be transitioned to outpatient physical therapy and a prescription was given for that. The patient will take aspirin 325 mg twice per day for DVT prophylaxis for the next three weeks. Anterior hip precautions were reinforced. Overall the patient is very happy with their outcome so far. Followup in 3 weeks with repeat x-rays.

## 2022-03-01 NOTE — ADDENDUM
[FreeTextEntry1] : This note was authored by Bautista Montoya working as a medical scribe for Dr. Vu Pacheco. The note was reviewed, edited, and revised by Dr. Vu Pacheco whom is in agreement with the exam findings, imaging findings, and treatment plan. 03/01/2022

## 2022-03-22 ENCOUNTER — APPOINTMENT (OUTPATIENT)
Dept: ORTHOPEDIC SURGERY | Facility: CLINIC | Age: 72
End: 2022-03-22
Payer: OTHER MISCELLANEOUS

## 2022-03-22 VITALS
SYSTOLIC BLOOD PRESSURE: 162 MMHG | HEIGHT: 69 IN | WEIGHT: 208 LBS | BODY MASS INDEX: 30.81 KG/M2 | DIASTOLIC BLOOD PRESSURE: 79 MMHG | HEART RATE: 76 BPM

## 2022-03-22 PROCEDURE — 73502 X-RAY EXAM HIP UNI 2-3 VIEWS: CPT | Mod: 26,RT

## 2022-03-22 PROCEDURE — 99024 POSTOP FOLLOW-UP VISIT: CPT

## 2022-03-22 NOTE — HISTORY OF PRESENT ILLNESS
[de-identified] : S/p Robotic assisted anterior THR with DIANA DOS:02/07/22\par \par  [de-identified] : BARAK DUBON is a 71 year male 6 weeks s/p right THR. He is ambulating and transferring well with a cane. He reports continuing outpatient physical therapy with good improvement of strength. He completed aspirin for DVT prophylaxis. He  has returned to daily activities of life without significant pain or discomfort. Overall, he is very happy with the results of the surgery.		  [de-identified] : Exam of the right hip and contralateral iliac crest shows a well healed incision, hip flexion of 90 degrees, hip external rotation of 35 degrees, hip internal rotation of 10 degrees. Patient can perform a straight leg raise.		 \par 5/5 motor strength bilaterally distally. Sensation intact distally.		  [de-identified] :  X-ray: AP of the pelvis and 2 views of the right hip demonstrate a right total hip arthroplasty in stable position, with no evidence of fracture, loosening, or dislocation.		  [de-identified] : The patient is doing very well 6 weeks following anterior right total hip replacement. Anterior hip precautions were reviewed and recommended to be followed for another 6 weeks. The patient will continue outpatient physical therapy and gradually transition to a home exercise program over the next 6 weeks. The patient may stop taking full strength aspirin at this point and resume preoperative aspirin dosing if applicable. Dental prophylaxis was reviewed. The patient is doing very well so far and overall very happy with their progress. Follow up in 6 weeks.

## 2022-03-22 NOTE — ADDENDUM
[FreeTextEntry1] : This note was authored by Bautista Montoya working as a medical scribe for Bautista Montoya. The note was reviewed, edited, and revised by Bautista Montoya whom is in agreement with the exam findings, imaging findings, and treatment plan. 03/22/2022		\par

## 2022-05-04 ENCOUNTER — APPOINTMENT (OUTPATIENT)
Dept: ORTHOPEDIC SURGERY | Facility: CLINIC | Age: 72
End: 2022-05-04
Payer: OTHER MISCELLANEOUS

## 2022-05-04 VITALS
HEIGHT: 69 IN | BODY MASS INDEX: 32.88 KG/M2 | HEART RATE: 85 BPM | WEIGHT: 222 LBS | SYSTOLIC BLOOD PRESSURE: 148 MMHG | OXYGEN SATURATION: 99 % | DIASTOLIC BLOOD PRESSURE: 75 MMHG

## 2022-05-04 DIAGNOSIS — Z47.1 AFTERCARE FOLLOWING JOINT REPLACEMENT SURGERY: ICD-10-CM

## 2022-05-04 DIAGNOSIS — Z96.649 PRESENCE OF UNSPECIFIED ARTIFICIAL HIP JOINT: ICD-10-CM

## 2022-05-04 PROCEDURE — 73502 X-RAY EXAM HIP UNI 2-3 VIEWS: CPT | Mod: 26,RT

## 2022-05-04 PROCEDURE — 99024 POSTOP FOLLOW-UP VISIT: CPT

## 2022-05-04 NOTE — HISTORY OF PRESENT ILLNESS
[de-identified] : S/p Robotic assisted anterior THR with DIANA DOS:02/07/22\par \par  [de-identified] : BARAK DUBON is a 71 year male 12 weeks s/p right THR. He is ambulating and transferring well without assistive devices. He reports continuing outpatient physical therapy with good improvement of strength. He  has returned to daily activities of life without significant pain or discomfort. He notes residual mild dull pain with SLR. HE denies any falls or trauma. He denies any fever or chills. Overall, he is very happy with the results of the surgery.		  [de-identified] : Exam of the right hip and contralateral iliac crest shows a well healed incision no signs of infection. ROM  hip flexion of 90 degrees, hip external rotation of 40 degrees, hip internal rotation of 20 degrees. Patient can perform a straight leg raise. No pain on exam. 		 \par 5/5 motor strength bilaterally distally. Sensation intact distally.		  [de-identified] :  X-ray: AP of the pelvis and 2 views of the right hip demonstrate a right total hip arthroplasty in stable position, with no evidence of fracture, loosening, or dislocation.		  [de-identified] : The patient is doing very well 12 weeks following anterior right total hip replacement. The patient will continue outpatient physical therapy and gradually transition to a home exercise program over the next 6 weeks. Dental prophylaxis was reviewed. The patient is doing very well so far and overall very happy with their progress. Follow up in 6 weeks if dull ache does not improve, otherwise follow up annually.

## 2022-05-04 NOTE — HISTORY OF PRESENT ILLNESS
[de-identified] : S/p Robotic assisted anterior THR with DIANA DOS:02/07/22\par \par  [de-identified] : BARAK DUBON is a 71 year male 6 weeks s/p right THR. He is ambulating and transferring well with a cane. He reports continuing outpatient physical therapy with good improvement of strength. He completed aspirin for DVT prophylaxis. He  has returned to daily activities of life without significant pain or discomfort. Overall, he is very happy with the results of the surgery.		  [de-identified] : Exam of the right hip and contralateral iliac crest shows a well healed incision, hip flexion of 90 degrees, hip external rotation of 35 degrees, hip internal rotation of 10 degrees. Patient can perform a straight leg raise.		 \par 5/5 motor strength bilaterally distally. Sensation intact distally.		  [de-identified] :  X-ray: AP of the pelvis and 2 views of the right hip demonstrate a right total hip arthroplasty in stable position, with no evidence of fracture, loosening, or dislocation.		  [de-identified] : The patient is doing very well 6 weeks following anterior right total hip replacement. Anterior hip precautions were reviewed and recommended to be followed for another 6 weeks. The patient will continue outpatient physical therapy and gradually transition to a home exercise program over the next 6 weeks. The patient may stop taking full strength aspirin at this point and resume preoperative aspirin dosing if applicable. Dental prophylaxis was reviewed. The patient is doing very well so far and overall very happy with their progress. Follow up in 6 weeks.

## 2022-06-28 ENCOUNTER — APPOINTMENT (OUTPATIENT)
Dept: ORTHOPEDIC SURGERY | Facility: CLINIC | Age: 72
End: 2022-06-28
Payer: OTHER MISCELLANEOUS

## 2022-06-28 VITALS
SYSTOLIC BLOOD PRESSURE: 116 MMHG | BODY MASS INDEX: 32.88 KG/M2 | DIASTOLIC BLOOD PRESSURE: 69 MMHG | HEIGHT: 69 IN | WEIGHT: 222 LBS | HEART RATE: 85 BPM

## 2022-06-28 PROCEDURE — 99213 OFFICE O/P EST LOW 20 MIN: CPT

## 2022-06-28 PROCEDURE — 73502 X-RAY EXAM HIP UNI 2-3 VIEWS: CPT | Mod: RT

## 2022-06-28 PROCEDURE — 99072 ADDL SUPL MATRL&STAF TM PHE: CPT

## 2022-06-28 NOTE — HISTORY OF PRESENT ILLNESS
[de-identified] : BARAK DUBON is a 71 year male 4.5 months s/p right THR. He reports continuing outpatient physical therapy 3 times a week. He feels groin pain when putting on his pants and underwear. He has no discomfort standing still. He reports his pain is "way better" than it was prior to his operation though his sciatica has returned. He has returned to daily activities of life. Overall, he is very happy with the result of the surgery.

## 2022-06-28 NOTE — PHYSICAL EXAM
[de-identified] : The patient appears well nourished  and in no apparent distress.  The patient is alert and oriented to person, place, and time.   Affect and mood appear normal. The head is normocephalic and atraumatic.  The eyes reveal normal sclera and extra ocular muscles are intact. The tongue is midline with no apparent lesions.  Skin shows normal turgor with no evidence of eczema or psoriasis.  No respiratory distress noted.  Sensation grossly intact.		  [de-identified] : Exam of the right hip shows a well healed incision, hip flexion of 90 degrees, hip external rotation of 45 degrees, hip internal rotation of 15 degrees. Patient can perform a straight leg raise with some weakness.  Pain in the groin with seated hip flexion.\par  5/5 motor strength bilaterally distally. Sensation intact distally.		  [de-identified] :  Prior X-ray: AP of the pelvis and 2 views of the right hip demonstrate a right total hip arthroplasty in stable position, with no evidence of fracture, loosening, or dislocation.

## 2022-06-28 NOTE — DISCUSSION/SUMMARY
[de-identified] : The patient is doing well 5 months following total hip replacement, but with psoas tendinitis. The patient will stop attending outpatient physical therapy and transition to home exercises to rest this a bit more. This pain is not preventing the patient from engaging in daily activities of life and is not limiting the quality of his life either. Overall he is making good progress and is very happy with the result so far. Dental prophylaxis was reviewed. Follow up in 3 months for reevaluation of his pain.

## 2022-06-28 NOTE — ADDENDUM
[FreeTextEntry1] : This note was authored by Bautista Montoya working as a medical scribe for Dr. Vu Pacheco. The note was reviewed, edited, and revised by Dr. Vu Pacheco whom is in agreement with the exam findings, imaging findings, and treatment plan. 06/28/2022

## 2022-06-28 NOTE — REASON FOR VISIT
[Follow-Up Visit] : a follow-up visit for [Other: ____] : [unfilled] [FreeTextEntry2] : S/p Robotic assisted anterior THR with DIANA DOS:02/07/22

## 2022-08-11 PROCEDURE — C1889: CPT

## 2022-08-11 PROCEDURE — 85027 COMPLETE CBC AUTOMATED: CPT

## 2022-08-11 PROCEDURE — 36415 COLL VENOUS BLD VENIPUNCTURE: CPT

## 2022-08-11 PROCEDURE — 80048 BASIC METABOLIC PNL TOTAL CA: CPT

## 2022-08-11 PROCEDURE — C1776: CPT

## 2022-08-11 PROCEDURE — S2900: CPT

## 2022-08-11 PROCEDURE — C1713: CPT

## 2022-08-11 PROCEDURE — 97163 PT EVAL HIGH COMPLEX 45 MIN: CPT

## 2022-08-11 PROCEDURE — 82962 GLUCOSE BLOOD TEST: CPT

## 2022-08-11 PROCEDURE — 76000 FLUOROSCOPY <1 HR PHYS/QHP: CPT

## 2022-08-11 PROCEDURE — 73502 X-RAY EXAM HIP UNI 2-3 VIEWS: CPT

## 2022-09-06 ENCOUNTER — APPOINTMENT (OUTPATIENT)
Dept: ORTHOPEDIC SURGERY | Facility: CLINIC | Age: 72
End: 2022-09-06

## 2022-09-06 VITALS
HEART RATE: 83 BPM | WEIGHT: 222 LBS | BODY MASS INDEX: 32.88 KG/M2 | HEIGHT: 69 IN | SYSTOLIC BLOOD PRESSURE: 118 MMHG | DIASTOLIC BLOOD PRESSURE: 72 MMHG

## 2022-09-06 PROCEDURE — 99072 ADDL SUPL MATRL&STAF TM PHE: CPT

## 2022-09-06 PROCEDURE — 99214 OFFICE O/P EST MOD 30 MIN: CPT

## 2022-09-06 PROCEDURE — 73502 X-RAY EXAM HIP UNI 2-3 VIEWS: CPT | Mod: RT

## 2022-09-08 LAB
CRP SERPL-MCNC: <3 MG/L
ERYTHROCYTE [SEDIMENTATION RATE] IN BLOOD BY WESTERGREN METHOD: 27 MM/HR

## 2022-09-19 ENCOUNTER — RESULT REVIEW (OUTPATIENT)
Age: 72
End: 2022-09-19

## 2022-10-17 ENCOUNTER — LABORATORY RESULT (OUTPATIENT)
Age: 72
End: 2022-10-17

## 2022-10-17 ENCOUNTER — OUTPATIENT (OUTPATIENT)
Dept: OUTPATIENT SERVICES | Facility: HOSPITAL | Age: 72
LOS: 1 days | End: 2022-10-17

## 2022-10-17 ENCOUNTER — APPOINTMENT (OUTPATIENT)
Dept: ULTRASOUND IMAGING | Facility: CLINIC | Age: 72
End: 2022-10-17

## 2022-10-17 DIAGNOSIS — Z96.82 PRESENCE OF NEUROSTIMULATOR: Chronic | ICD-10-CM

## 2022-10-17 DIAGNOSIS — Z98.890 OTHER SPECIFIED POSTPROCEDURAL STATES: Chronic | ICD-10-CM

## 2022-10-17 DIAGNOSIS — Z96.641 PRESENCE OF RIGHT ARTIFICIAL HIP JOINT: ICD-10-CM

## 2022-10-17 DIAGNOSIS — Z95.828 PRESENCE OF OTHER VASCULAR IMPLANTS AND GRAFTS: Chronic | ICD-10-CM

## 2022-10-17 DIAGNOSIS — Z96.652 PRESENCE OF LEFT ARTIFICIAL KNEE JOINT: Chronic | ICD-10-CM

## 2022-10-17 DIAGNOSIS — Z95.5 PRESENCE OF CORONARY ANGIOPLASTY IMPLANT AND GRAFT: Chronic | ICD-10-CM

## 2022-10-17 DIAGNOSIS — Z92.241 PERSONAL HISTORY OF SYSTEMIC STEROID THERAPY: Chronic | ICD-10-CM

## 2022-10-17 PROCEDURE — 20611 DRAIN/INJ JOINT/BURSA W/US: CPT | Mod: RT

## 2022-11-18 ENCOUNTER — APPOINTMENT (OUTPATIENT)
Dept: DERMATOLOGY | Facility: CLINIC | Age: 72
End: 2022-11-18

## 2022-11-18 ENCOUNTER — RESULT REVIEW (OUTPATIENT)
Age: 72
End: 2022-11-18

## 2022-11-18 ENCOUNTER — APPOINTMENT (OUTPATIENT)
Dept: ORTHOPEDIC SURGERY | Facility: CLINIC | Age: 72
End: 2022-11-18

## 2022-11-18 VITALS
HEIGHT: 69 IN | DIASTOLIC BLOOD PRESSURE: 81 MMHG | BODY MASS INDEX: 32.88 KG/M2 | WEIGHT: 222 LBS | SYSTOLIC BLOOD PRESSURE: 132 MMHG | HEART RATE: 86 BPM

## 2022-11-18 DIAGNOSIS — M76.11 PSOAS TENDINITIS, RIGHT HIP: ICD-10-CM

## 2022-11-18 PROCEDURE — 99072 ADDL SUPL MATRL&STAF TM PHE: CPT

## 2022-11-18 PROCEDURE — 99214 OFFICE O/P EST MOD 30 MIN: CPT

## 2022-11-18 NOTE — ADDENDUM
[FreeTextEntry1] : This note was authored by Bautista Montoya working as a medical scribe for Dr. Vu Pacheco. The note was reviewed, edited, and revised by Dr. Vu Pacheco whom is in agreement with the exam findings, imaging findings, and treatment plan. 11/18/2022

## 2022-11-18 NOTE — REASON FOR VISIT
[Follow-Up Visit] : a follow-up visit for [Workers' Comp: Date of Injury: _______] : This visit is related to worker's compensation. Date of Injury: [unfilled] [Other: ____] : [unfilled] [FreeTextEntry2] : S/p Robotic assisted anterior THR with DIANA DOS:02/07/22\par \par

## 2022-11-18 NOTE — HISTORY OF PRESENT ILLNESS
[de-identified] : BARAK DUBON is a 72 year old male who presents 9 months s/p right THR. He still reports groin pain similar to what was present prior to surgery when trying to lift his leg but he has no pain at all when walking flat ground.. Particularly it is present when exiting or entering the tub or when putting his shoes and socks on. At his last visit in office on 9/6/2022 the patient was sent for inflammatory marker testing.  After that visit fluid was also aspirated from his hip under ultrasound guidance and sent for cell count and cultures. ESR-27 CRP-.3 Cultures are negative.  Cell count showed 4468 white blood cells with 22% neutrophils.  He presents today to follow up.\par \par

## 2022-11-18 NOTE — PHYSICAL EXAM
[de-identified] : The patient appears well nourished  and in no apparent distress.  The patient is alert and oriented to person, place, and time.   Affect and mood appear normal. The head is normocephalic and atraumatic.  The eyes reveal normal sclera and extra ocular muscles are intact. The tongue is midline with no apparent lesions.  Skin shows normal turgor with no evidence of eczema or psoriasis.  No respiratory distress noted.  Sensation grossly intact.		  [de-identified] : Exam of the right hip shows groin pain with active hip flexion.  He is able to walk with no limp.\par 5/5 motor strength bilaterally distally. Sensation intact distally.

## 2022-11-18 NOTE — REASON FOR VISIT
[Follow-Up Visit] : a follow-up visit for [Workers' Comp: Date of Injury: _____] : This visit is related to worker's compensation. Date of Injury: [unfilled] [Other: ____] : [unfilled] [FreeTextEntry2] : S/p Robotic assisted anterior THR with DIANA DOS:02/07/22\par

## 2022-11-18 NOTE — ADDENDUM
[FreeTextEntry1] : This note was authored by Bautista Montoya working as a medical scribe for Dr. Vu Pacheco. The note was reviewed, edited, and revised by Dr. Vu Pacheco whom is in agreement with the exam findings, imaging findings, and treatment plan. 09/06/2022

## 2022-11-18 NOTE — HISTORY OF PRESENT ILLNESS
[de-identified] : BARAK DUBON is a 71 year male 7 months s/p right THR. He reports continued  groin pain when active flexion as well as getting dressed lifting his leg into his pants. He has no discomfort standing still and no pain while walking.  Patient was seen three months ago with the same complaints and was diagnosed with psoas tendinitis. He has since backed off of PT. He takes Tylenol as he cannot take NSAIDs due to a peptic ulcer. HE denies any fevers or chills. He denies any falls or truama.  He is unable to go for an MRI because of a spinal cord stimulator.  He reports that the stimulator is not working and he is considering having it removed.  He is following up for that at the end of this month.

## 2022-11-18 NOTE — DISCUSSION/SUMMARY
[de-identified] : BARAK DUBON is a 72 year old male who presents 9 months s/p right THR with continued groin pain. He likely has psoas tendonitis.  Unfortunately we cannot confirm this with MRI because of a spinal cord stimulator.  He was hoping to have the stimulator removed but that seems to be on hold waiting for Worker's Comp. approval.  Cultures from his recent knee aspiration on 9/6/2022 were negative.  As such there is no concern for infection at this time. The patient was recommended to undergo a  cortisone injection under imaging guidance in his right psoas tendon for both diagnostic and therapeutic purposes. We discussed that if this injection provides the patient with any relief, it is likely that his pain is coming from psoas tendonitis.  We again discussed the possibility of psoas tendon release if it becomes necessary.  The patient will follow up 3 weeks post injection.

## 2022-11-18 NOTE — DISCUSSION/SUMMARY
[de-identified] : Patient is a 72-year-old male 7 months following right anterior hip replacement with probable psoas tendonitis.  This has not improved with a period of modified activity and refraining from therapy.  Unfortunately he is unable to undergo an MRI to assess the psoas tendon.  For completeness I am recommending inflammatory marker testing to rule out infection although my suspicion for that is very long.  If infection is ruled out we may proceed with an ultrasound-guided right iliopsoas injection for diagnostic and therapeutic purposes he is considering having his spinal cord stimulator removed but that will require approval through Worker's Compensation which may not happen quickly.  Because of his discomfort he may opt to undergo the psoas tendon injection before obtaining the MRI, rather than waiting until the spinal cord stimulator is removed to get the MRI done.  We discussed the ultimate option of psoas tendon release, both arthroscopic and open techniques.  This would be a last resort.

## 2022-11-18 NOTE — PHYSICAL EXAM
[de-identified] : The patient appears well nourished  and in no apparent distress.  The patient is alert and oriented to person, place, and time.   Affect and mood appear normal. The head is normocephalic and atraumatic.  The eyes reveal normal sclera and extra ocular muscles are intact. The tongue is midline with no apparent lesions.  Skin shows normal turgor with no evidence of eczema or psoriasis.  No respiratory distress noted.  Sensation grossly intact.		  [de-identified] : Exam of the right hip and contralateral iliac crest shows well healed incisions, and there is groin pain when extending the hip from a flexed position while actively resisting. There is less pain with passive range of motion.  Groin pain with resisted hip flexion while sitting.  The right hip incision is healed well without any sign of infection.\par \par 5/5 motor strength bilaterally distally. Sensation intact distally.		  [de-identified] :  X-ray: AP of the pelvis and 2 views of the right hip demonstrate a right total hip arthroplasty in stable position, with no evidence of fracture, loosening, or dislocation.

## 2022-12-22 ENCOUNTER — APPOINTMENT (OUTPATIENT)
Dept: ULTRASOUND IMAGING | Facility: CLINIC | Age: 72
End: 2022-12-22
Payer: OTHER MISCELLANEOUS

## 2022-12-22 ENCOUNTER — OUTPATIENT (OUTPATIENT)
Dept: OUTPATIENT SERVICES | Facility: HOSPITAL | Age: 72
LOS: 1 days | End: 2022-12-22

## 2022-12-22 DIAGNOSIS — Z98.890 OTHER SPECIFIED POSTPROCEDURAL STATES: Chronic | ICD-10-CM

## 2022-12-22 DIAGNOSIS — Z95.5 PRESENCE OF CORONARY ANGIOPLASTY IMPLANT AND GRAFT: Chronic | ICD-10-CM

## 2022-12-22 DIAGNOSIS — Z96.641 PRESENCE OF RIGHT ARTIFICIAL HIP JOINT: ICD-10-CM

## 2022-12-22 DIAGNOSIS — Z96.82 PRESENCE OF NEUROSTIMULATOR: Chronic | ICD-10-CM

## 2022-12-22 DIAGNOSIS — Z95.828 PRESENCE OF OTHER VASCULAR IMPLANTS AND GRAFTS: Chronic | ICD-10-CM

## 2022-12-22 DIAGNOSIS — Z92.241 PERSONAL HISTORY OF SYSTEMIC STEROID THERAPY: Chronic | ICD-10-CM

## 2022-12-22 DIAGNOSIS — Z96.652 PRESENCE OF LEFT ARTIFICIAL KNEE JOINT: Chronic | ICD-10-CM

## 2022-12-22 PROCEDURE — 20611 DRAIN/INJ JOINT/BURSA W/US: CPT | Mod: RT

## 2023-01-10 ENCOUNTER — APPOINTMENT (OUTPATIENT)
Dept: ORTHOPEDIC SURGERY | Facility: CLINIC | Age: 73
End: 2023-01-10
Payer: OTHER MISCELLANEOUS

## 2023-01-10 VITALS
WEIGHT: 222 LBS | HEART RATE: 102 BPM | HEIGHT: 69 IN | SYSTOLIC BLOOD PRESSURE: 122 MMHG | DIASTOLIC BLOOD PRESSURE: 75 MMHG | BODY MASS INDEX: 32.88 KG/M2

## 2023-01-10 PROCEDURE — 99072 ADDL SUPL MATRL&STAF TM PHE: CPT

## 2023-01-10 PROCEDURE — 99213 OFFICE O/P EST LOW 20 MIN: CPT

## 2023-01-10 NOTE — PHYSICAL EXAM
[de-identified] : The patient appears well nourished  and in no apparent distress.  The patient is alert and oriented to person, place, and time.   Affect and mood appear normal. The head is normocephalic and atraumatic.  The eyes reveal normal sclera and extra ocular muscles are intact. The tongue is midline with no apparent lesions.  Skin shows normal turgor with no evidence of eczema or psoriasis.  No respiratory distress noted.  Sensation grossly intact.		  [de-identified] : Exam of the right hip shows a well healed incision, the patient can figure four, hip internal rotation of 20 degrees, hip abduction of 30 degrees. Patient can perform a straight leg raise.  Minimal pain when doing a straight leg raise.		 \par 5/5 motor strength bilaterally distally. Sensation intact distally.		  [de-identified] : Prior X-ray: AP of the pelvis and 2 views of the right hip demonstrate a right total hip arthroplasty in stable position, with no evidence of fracture, loosening, or dislocation.

## 2023-01-10 NOTE — ADDENDUM
[FreeTextEntry1] : This note was authored by Bautista Montoya working as a medical scribe for Dr. Vu Pacheco. The note was reviewed, edited, and revised by Dr. Vu Pacheco whom is in agreement with the exam findings, imaging findings, and treatment plan. 01/10/2023

## 2023-01-10 NOTE — REASON FOR VISIT
[Follow-Up Visit] : a follow-up visit for [Workers' Comp: Date of Injury: _____] : This visit is related to worker's compensation. Date of Injury: [unfilled] [Other: ____] : [unfilled] [FreeTextEntry2] : S/P Right robotic assisted anterior total hip replacement with Sundar, DOS: 2/7/22.

## 2023-01-10 NOTE — HISTORY OF PRESENT ILLNESS
[de-identified] : BARAK DUBON is a 72 year old male who presents 11 months s/p right THR with some continued groin pain improved from last visit.  Previous ESR and CRP levels are not concerning for infection and cultures from aspirated fluid were negative. He recently underwent an image guided cortisone injection in his psoas tendon 1 month ago. He felt relief for the first 2-3 days following the injection.  The relief was almost 100%.  After that the relief was not as complete however he has experienced improvement since his injection his groin pain has become more intermittent.  He has no pain at all when walking.  He has occasional pain with lifting the leg.  He reports greater strength with hip flexion. He presents today to follow up.

## 2023-01-10 NOTE — DISCUSSION/SUMMARY
[de-identified] : BARAK DUBON is a 72 year old male who presents 11 months s/p right THR with some residual groin pain.  He is much better than at the prior visit.  I think the injection has provided him with significant relief.  On recent imaging the patient's implants appear stable and well fixed without signs of loosening or fracture. Due to his relief from his recent cortisone injection in his psoas tendon, an MRI of his right hip is not necessary at this time. We briefly discussed the possibility of psoas tendon release surgery, however, the patient can live with his pain as is since it is improved and I think the risk of surgery outweighs the current potential benefit. His hip is not impacting his ability to work or partake in ADL's, unlike his back pain which he will continue to seek treatment for. Dental prophylaxis was reviewed. He will follow up for radiographic surveillance in 3 years.

## 2023-02-07 NOTE — ASU PREOP CHECKLIST - CHLOROHEXIDINE WASH 1
Show Applicator Variable?: Yes Duration Of Freeze Thaw-Cycle (Seconds): 0 Detail Level: Detailed Consent: The patient's verbal consent was obtained including but not limited to risks of crusting, scabbing, blistering, scarring, darker or lighter pigmentary change, recurrence, incomplete removal and infection. Post-Care Instructions: I reviewed with the patient in detail post-care instructions. Patient is to wear sunprotection, and avoid picking at any of the treated lesions. Patient may apply Vaseline to crusted or scabbing areas. Render Note In Bullet Format When Appropriate: No Medical Necessity Clause: This procedure was medically necessary because the lesions that were treated were: Spray Paint Text: The liquid nitrogen was applied to the skin utilizing a spray paint frosting technique. Consent: The patient's consent was obtained including but not limited to risks of crusting, scabbing, blistering, scarring, darker or lighter pigmentary change, recurrence, incomplete removal and infection. 05-Feb-2022 Post-Care Instructions: I reviewed with the patient in detail post-care instructions. Patient is to wear sunprotection, and avoid picking at any of the treated lesions. Pt may apply Vaseline to crusted or scabbing areas. Medical Necessity Information: It is in your best interest to select a reason for this procedure from the list below. All of these items fulfill various CMS LCD requirements except the new and changing color options.

## 2023-03-21 ENCOUNTER — APPOINTMENT (OUTPATIENT)
Dept: ORTHOPEDIC SURGERY | Facility: CLINIC | Age: 73
End: 2023-03-21
Payer: OTHER MISCELLANEOUS

## 2023-03-21 VITALS
WEIGHT: 222 LBS | HEIGHT: 69 IN | SYSTOLIC BLOOD PRESSURE: 171 MMHG | DIASTOLIC BLOOD PRESSURE: 79 MMHG | BODY MASS INDEX: 32.88 KG/M2

## 2023-03-21 DIAGNOSIS — Z96.641 PRESENCE OF RIGHT ARTIFICIAL HIP JOINT: ICD-10-CM

## 2023-03-21 PROCEDURE — 73502 X-RAY EXAM HIP UNI 2-3 VIEWS: CPT

## 2023-03-21 PROCEDURE — 99213 OFFICE O/P EST LOW 20 MIN: CPT

## 2023-03-23 NOTE — REASON FOR VISIT
[Follow-Up Visit] : a follow-up visit for [Workers' Comp: Date of Injury: _____] : This visit is related to worker's compensation. Date of Injury: [unfilled] [Other: ____] : [unfilled] [FreeTextEntry2] : S/p right Robotic assisted anterior THR with DIANA DOS:02/07/22

## 2023-03-23 NOTE — HISTORY OF PRESENT ILLNESS
[de-identified] : BARAK DUBON is a 72 year old male who presents 13 months s/p right THR with some continued intermittent groin discomfort. He denies true pain. Previous ESR and CRP levels are not concerning for infection and cultures from aspirated fluid were negative. He recently underwent an image guided cortisone injection in his psoas tendon approximately 3 months ago and reports continued relief from this. He has no pain at all when walking.  He has occasional discomfort with lifting the leg.  He reports greater strength with hip flexion. He presents today to follow up.

## 2023-03-23 NOTE — DISCUSSION/SUMMARY
[de-identified] : The patient is doing well 13 months following total right hip replacement. His residual hip pain has mostly resolved following a cortisone injection in his right psoas tendon. The patient will continue their home exercises. On imaging the patient's implants appear stable and well fixed without signs of loosening or fracture. Overall the patient is very happy with the outcome of the surgery.  Dental prophylaxis was reviewed. Follow up in 2 years for radiographic surveillance. \par \par Patient SLU 35%.

## 2023-03-23 NOTE — ADDENDUM
[FreeTextEntry1] : This note was authored by Bautista Montoya working as a medical scribe for Dr. Vu Pacheco. The note was reviewed, edited, and revised by Dr. Vu Pacheco whom is in agreement with the exam findings, imaging findings, and treatment plan. 03/21/2023

## 2023-03-23 NOTE — PHYSICAL EXAM
[de-identified] : The patient appears well nourished  and in no apparent distress.  The patient is alert and oriented to person, place, and time.   Affect and mood appear normal. The head is normocephalic and atraumatic.  The eyes reveal normal sclera and extra ocular muscles are intact. The tongue is midline with no apparent lesions.  Skin shows normal turgor with no evidence of eczema or psoriasis.  No respiratory distress noted.  Sensation grossly intact.		  [de-identified] : Exam of the right hip shows a well healed incision, hip flexion of 100 degrees, hip external rotation of 45 degrees, hip internal rotation of 20 degrees. Patient can perform a straight leg raise.		\par 5/5 motor strength bilaterally distally. Sensation intact distally.	  [de-identified] :  X-ray: AP of the pelvis and 2 views of the right hip demonstrate a right total hip arthroplasty in stable position, with no evidence of fracture, loosening, or dislocation.

## 2023-05-11 RX ORDER — AMOXICILLIN 500 MG/1
500 TABLET, FILM COATED ORAL
Qty: 4 | Refills: 0 | Status: ACTIVE | COMMUNITY
Start: 2023-05-11 | End: 1900-01-01

## 2023-06-06 ENCOUNTER — APPOINTMENT (OUTPATIENT)
Dept: ORTHOPEDIC SURGERY | Facility: CLINIC | Age: 73
End: 2023-06-06
Payer: MEDICARE

## 2023-06-06 VITALS
BODY MASS INDEX: 32.88 KG/M2 | DIASTOLIC BLOOD PRESSURE: 83 MMHG | SYSTOLIC BLOOD PRESSURE: 137 MMHG | WEIGHT: 222 LBS | HEIGHT: 69 IN

## 2023-06-06 PROCEDURE — 99203 OFFICE O/P NEW LOW 30 MIN: CPT

## 2023-06-06 PROCEDURE — 73502 X-RAY EXAM HIP UNI 2-3 VIEWS: CPT

## 2023-06-06 NOTE — REASON FOR VISIT
[Follow-Up Visit] : a follow-up visit for [Workers' Comp: Date of Injury: _____] : This visit is related to worker's compensation. Date of Injury: [unfilled] [Other: ____] : [unfilled] [FreeTextEntry2] : S/p right Robotic assisted anterior THR with DIANA DOS:02/07/22. \par

## 2023-06-06 NOTE — HISTORY OF PRESENT ILLNESS
[de-identified] : Patient is a 72-year-old male status post right total hip replacement 2/7/2022 presenting for follow-up evaluation of his bilateral hips.  His right hip is doing very well over 1 year from surgery.  His left hip pain is now progressively worsening.  His hip pain is localized to the left groin.  Pain is worse with weightbearing typically as well as all range of motion of the hip.  He mitts to worsening stiffness of the left hip which now interferes with putting on socks and shoes.  He has not had intra-articular injections thus far.  He has tried therapy and home exercise without relief.

## 2023-06-06 NOTE — ADDENDUM
[FreeTextEntry1] : This note was authored by Bautista Montoya working as a medical scribe for Dr. Vu Pacheco. The note was reviewed, edited, and revised by Dr. Vu Pacheco whom is in agreement with the exam findings, imaging findings, and treatment plan. 06/06/2023

## 2023-06-06 NOTE — DISCUSSION/SUMMARY
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 We discussed total hip replacement surgery or potentially trying an image guided intraarticular cortisone injection in the hip.  Left hip replacement surgery as well as the recovery was discussed.  He is going to consider treatment options and follow up depending on what he would like to do.

## 2023-06-06 NOTE — PHYSICAL EXAM
[de-identified] : The patient appears well nourished  and in no apparent distress.  The patient is alert and oriented to person, place, and time.   Affect and mood appear normal. The head is normocephalic and atraumatic.  The eyes reveal normal sclera and extra ocular muscles are intact. The tongue is midline with no apparent lesions.  Skin shows normal turgor with no evidence of eczema or psoriasis.  No respiratory distress noted.  Sensation grossly intact.		  [de-identified] : Exam of the left hip shows hip flexion of 70 degrees, hip external rotation of 30 degrees, hip internal rotation of 0 degrees. He walks with a limp on the left side.  \par 5/5 motor strength bilaterally distally. Sensation intact distally.		  [de-identified] : X-ray: AP view of the pelvis and 2 views of the left hip demonstrate bone on bone arthritis.  AP pelvis view shows a right total hip replacement in stable position.

## 2023-06-20 ENCOUNTER — APPOINTMENT (OUTPATIENT)
Dept: ORTHOPEDIC SURGERY | Facility: CLINIC | Age: 73
End: 2023-06-20
Payer: OTHER MISCELLANEOUS

## 2023-06-20 ENCOUNTER — LABORATORY RESULT (OUTPATIENT)
Age: 73
End: 2023-06-20

## 2023-06-20 VITALS
OXYGEN SATURATION: 100 % | HEIGHT: 69 IN | DIASTOLIC BLOOD PRESSURE: 73 MMHG | HEART RATE: 82 BPM | WEIGHT: 222 LBS | BODY MASS INDEX: 32.88 KG/M2 | SYSTOLIC BLOOD PRESSURE: 115 MMHG

## 2023-06-20 DIAGNOSIS — M25.561 PAIN IN RIGHT KNEE: ICD-10-CM

## 2023-06-20 DIAGNOSIS — Z96.652 PRESENCE OF LEFT ARTIFICIAL KNEE JOINT: ICD-10-CM

## 2023-06-20 DIAGNOSIS — M25.562 PAIN IN LEFT KNEE: ICD-10-CM

## 2023-06-20 PROCEDURE — 99214 OFFICE O/P EST MOD 30 MIN: CPT | Mod: 25

## 2023-06-20 PROCEDURE — 73562 X-RAY EXAM OF KNEE 3: CPT | Mod: LT

## 2023-06-20 PROCEDURE — 73564 X-RAY EXAM KNEE 4 OR MORE: CPT | Mod: RT

## 2023-06-20 PROCEDURE — 20610 DRAIN/INJ JOINT/BURSA W/O US: CPT | Mod: LT

## 2023-06-20 NOTE — ADDENDUM
[FreeTextEntry1] : This note was authored by Bautista Montoya working as a medical scribe for Dr. Vu Pacheco. The note was reviewed, edited, and revised by Dr. Vu Pacheco whom is in agreement with the exam findings, imaging findings, and treatment plan. 06/20/2023

## 2023-06-20 NOTE — PHYSICAL EXAM
[de-identified] : The patient appears well nourished  and in no apparent distress.  The patient is alert and oriented to person, place, and time.   Affect and mood appear normal. The head is normocephalic and atraumatic.  The eyes reveal normal sclera and extra ocular muscles are intact. The tongue is midline with no apparent lesions.  Skin shows normal turgor with no evidence of eczema or psoriasis.  No respiratory distress noted.  Sensation grossly intact.		  [de-identified] : Exam of the right knee shows 0 to 118 degrees of flexion measured with a goniometer. Pain with flexion.  Correctable varus, intact LCL.\par 5/5 motor strength bilaterally distally. Sensation intact distally.		  [de-identified] : X-ray: 4 views of the right knee demonstrate moderate to severe varus and patellofemoral compartment arthritis.

## 2023-06-20 NOTE — DISCUSSION/SUMMARY
[de-identified] : BARAK DUBON is a 72 year old male who presents with left knee pain s/p medial unicompartmental knee replacement in 2010. 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 Swelling was noted on exam and 8 cc's of blood tinged yellow synovial fluid was aspirated from his left knee in office today to be sent for cell count and cultures to rule out infection. Suspicion for infection is low at this time. The patient will follow up after the results of this testing are available.  Most likely his effusion is related to progression of arthritis in the other compartments of the knee.  He is not in significant pain other than the recurrent swelling.  He is also being managed for osteoarthritis of his right knee and so if his left knee aspiration is negative, which presumably it will be, we will focus treatment for the right knee.

## 2023-06-20 NOTE — PROCEDURE
[de-identified] : The left knee was aspirated using a 20 gauge needle from a superolateral approach using sterile technique. 8 CC's of blood tinged yellow clear normal appearing synovial fluid was obtained.

## 2023-06-20 NOTE — HISTORY OF PRESENT ILLNESS
[de-identified] : BARAK DUBON is a 72 year old male who presents with left knee pain. He reports intermittent swelling and tightness in the knee. This pain has been significant for 3 weeks now. He underwent medial unicompartmental knee replacement in 2010 by Dr. Stanley. He does not report fever or chills at this time. He does not report and recent falls or traumas involving the left knee. He presents today for initial evaluation of his left knee in this office.

## 2023-06-20 NOTE — REASON FOR VISIT
[Follow-Up Visit] : a follow-up visit for [Other: ____] : [unfilled] [FreeTextEntry2] : S/P right robotic-assisted anterior THR with DIANA; DOS: 02/07/22.

## 2023-06-20 NOTE — DISCUSSION/SUMMARY
[de-identified] : BARAK DUBON is a 72 year old male who presents with right knee moderate to severe varus and patellofemoral compartment arthritis. Nonoperative treatment options for knee arthritis were discussed. The patient would like to receive an intraarticular cortisone injection in the right knee which will be provided at a later date after infection in his left knee has been ruled out.

## 2023-06-20 NOTE — HISTORY OF PRESENT ILLNESS
[de-identified] : BARAK DUBON is a 72 year old male who presents with right knee pain. This pain has been significant for the past three weeks. This pain is constant. He localizes this pain mostly medially and anteriorly. He reports intermittent swelling in the knee as well as pain with extended ambulation and stair climbing. He has not yet had injections in the knee. He presents today for initial evaluation of his right knee.

## 2023-06-20 NOTE — PHYSICAL EXAM
[de-identified] : The patient appears well nourished  and in no apparent distress.  The patient is alert and oriented to person, place, and time.   Affect and mood appear normal. The head is normocephalic and atraumatic.  The eyes reveal normal sclera and extra ocular muscles are intact. The tongue is midline with no apparent lesions.  Skin shows normal turgor with no evidence of eczema or psoriasis.  No respiratory distress noted.  Sensation grossly intact.		  [de-identified] : Exam of the left knee shows a well healed incision, 0 to 115 degrees of flexion measured with a goniometer. There is no instability.  \par 5/5 motor strength bilaterally distally. Sensation intact distally.		  [de-identified] : X-ray: 3 views of the left knee demonstrate a medial unicompartmental knee replacement that appears well fixed with lateral edge loading of the femoral component. There is moderate joint space narrowing of the lateral and patellofemoral compartments.

## 2023-06-22 LAB
B PERT IGG+IGM PNL SER: ABNORMAL
COLOR FLD: NORMAL
EOSINOPHIL # FLD MANUAL: 0 %
FLUID INTAKE SUBSTANCE CLASS: NORMAL
LYMPHOCYTES # FLD MANUAL: 28 %
MESOTHL CELL NFR FLD: 0 %
MONOS+MACROS NFR FLD MANUAL: 35 %
NEUTS SEG # FLD MANUAL: 37 %
NRBC # FLD: 0 %
RBC # FLD MANUAL: ABNORMAL /UL
TOTAL CELLS COUNTED FLD: 570 /UL
TUBE TYPE: NORMAL
UNIDENT CELLS NFR FLD MANUAL: 0 %
VARIANT LYMPHS # FLD MANUAL: 0 %

## 2023-06-27 ENCOUNTER — APPOINTMENT (OUTPATIENT)
Dept: ORTHOPEDIC SURGERY | Facility: CLINIC | Age: 73
End: 2023-06-27
Payer: OTHER MISCELLANEOUS

## 2023-06-27 VITALS
HEART RATE: 93 BPM | SYSTOLIC BLOOD PRESSURE: 117 MMHG | WEIGHT: 222 LBS | HEIGHT: 69 IN | DIASTOLIC BLOOD PRESSURE: 70 MMHG | BODY MASS INDEX: 32.88 KG/M2

## 2023-06-27 PROCEDURE — 20611 DRAIN/INJ JOINT/BURSA W/US: CPT | Mod: RT

## 2023-06-27 PROCEDURE — 99213 OFFICE O/P EST LOW 20 MIN: CPT | Mod: 25

## 2023-06-27 NOTE — DISCUSSION/SUMMARY
[de-identified] : BARAK DUBON is a 72 year old male who presents with right knee moderate to severe varus and patellofemoral compartment arthritis. Nonoperative treatment options for knee arthritis were discussed. The patient received a steroid injection to the right knee and tolerated well. He will follow up in 6 weeks.

## 2023-06-27 NOTE — PROCEDURE
[de-identified] : Using sterile technique, 2cc of depomedrol 40mg/ml, 4cc of 1% plain lidocaine, and 2 cc 0.25% marcaine was drawn up into a sterile syringe. The right knee joint space was identified using ultrasound. The right knee was then sterilely prepped with chlorhexidine. Ethyl chloride spray was used to anesthetize the skin and subQ tissue. The depomedrol/lidocaine/marcaine mixture was then injected into the knee joint in the superolateral position under ultrasound guidance and the needle position in the joint space was confirmed. The patient tolerated the procedure well without difficulty. The patient was given instructions on the use of ice and anti-inflammatories post injection site soreness.

## 2023-06-27 NOTE — HISTORY OF PRESENT ILLNESS
[de-identified] : BARAK DUBON is a 72 year old male who presents with right knee pain. This pain has been significant for the past three weeks. This pain is constant. He localizes this pain mostly medially and anteriorly. He reports intermittent swelling in the knee as well as pain with extended ambulation and stair climbing. He has not yet had injections in the knee. He presents today hopeful for a steroid injection.

## 2023-06-27 NOTE — PHYSICAL EXAM
[de-identified] : The patient appears well nourished  and in no apparent distress.  The patient is alert and oriented to person, place, and time.   Affect and mood appear normal. The head is normocephalic and atraumatic.  The eyes reveal normal sclera and extra ocular muscles are intact. The tongue is midline with no apparent lesions.  Skin shows normal turgor with no evidence of eczema or psoriasis.  No respiratory distress noted.  Sensation grossly intact.		  [de-identified] : Exam of the right knee shows 0 to 118 degrees of flexion measured with a goniometer. Pain with flexion.  Correctable varus, intact LCL.\par 5/5 motor strength bilaterally distally. Sensation intact distally.		  [de-identified] : Prior X-ray: 4 views of the right knee demonstrate moderate to severe varus and patellofemoral compartment arthritis.

## 2023-06-27 NOTE — PROCEDURE
[de-identified] : Using sterile technique, 2cc of depomedrol 40mg/ml, 4cc of 1% plain lidocaine, and 2 cc 0.25% marcaine was drawn up into a sterile syringe. The right knee joint space was identified using ultrasound. The right knee was then sterilely prepped with chlorhexidine. Ethyl chloride spray was used to anesthetize the skin and subQ tissue. The depomedrol/lidocaine/marcaine mixture was then injected into the knee joint in the superolateral position under ultrasound guidance and the needle position in the joint space was confirmed. The patient tolerated the procedure well without difficulty. The patient was given instructions on the use of ice and anti-inflammatories post injection site soreness.

## 2023-06-27 NOTE — DISCUSSION/SUMMARY
[de-identified] : BARAK DUBON is a 72 year old male who presents with right knee moderate to severe varus and patellofemoral compartment arthritis. Nonoperative treatment options for knee arthritis were discussed. The patient received a steroid injection to the right knee and tolerated well. He will follow up in 6 weeks.

## 2023-06-27 NOTE — PHYSICAL EXAM
[de-identified] : The patient appears well nourished  and in no apparent distress.  The patient is alert and oriented to person, place, and time.   Affect and mood appear normal. The head is normocephalic and atraumatic.  The eyes reveal normal sclera and extra ocular muscles are intact. The tongue is midline with no apparent lesions.  Skin shows normal turgor with no evidence of eczema or psoriasis.  No respiratory distress noted.  Sensation grossly intact.		  [de-identified] : Exam of the right knee shows 0 to 118 degrees of flexion measured with a goniometer. Pain with flexion.  Correctable varus, intact LCL.\par 5/5 motor strength bilaterally distally. Sensation intact distally.		  [de-identified] : Prior X-ray: 4 views of the right knee demonstrate moderate to severe varus and patellofemoral compartment arthritis.

## 2023-06-27 NOTE — HISTORY OF PRESENT ILLNESS
[de-identified] : BARAK DUBON is a 72 year old male who presents with right knee pain. This pain has been significant for the past three weeks. This pain is constant. He localizes this pain mostly medially and anteriorly. He reports intermittent swelling in the knee as well as pain with extended ambulation and stair climbing. He has not yet had injections in the knee. He presents today hopeful for a steroid injection.

## 2023-08-08 ENCOUNTER — APPOINTMENT (OUTPATIENT)
Dept: ORTHOPEDIC SURGERY | Facility: CLINIC | Age: 73
End: 2023-08-08
Payer: OTHER MISCELLANEOUS

## 2023-08-08 VITALS — HEIGHT: 69 IN | BODY MASS INDEX: 32.88 KG/M2 | WEIGHT: 222 LBS

## 2023-08-08 DIAGNOSIS — M17.11 UNILATERAL PRIMARY OSTEOARTHRITIS, RIGHT KNEE: ICD-10-CM

## 2023-08-08 PROCEDURE — 99213 OFFICE O/P EST LOW 20 MIN: CPT

## 2023-08-08 RX ORDER — SODIUM HYALURONATE INTRA-ARTICULAR SOLN PREF SYR 25 MG/2.5ML 25/2.5 MG/ML
25 SOLUTION PREFILLED SYRINGE INTRAARTICULAR
Qty: 5 | Refills: 0 | Status: ACTIVE | OUTPATIENT
Start: 2023-08-08

## 2023-08-08 NOTE — HISTORY OF PRESENT ILLNESS
[de-identified] : BARAK DUBON is a 72 year old male who presents with right knee pain. This pain has been significant for the past three weeks. This pain is constant. He localizes this pain mostly medially and anteriorly. He reports intermittent swelling in the knee as well as pain with extended ambulation and stair climbing. He was seen recently and received a steroid injection on 6/27/23. He reports only 4-5 days of relief from this. Patient has not had gel injections. He has tried PT and NSAIDs without relief.  PMHx: DM with unknown A1C.

## 2023-08-08 NOTE — DISCUSSION/SUMMARY
[de-identified] : BARAK DUBON is a 72 year old male who presents with right knee moderate to severe varus and patellofemoral compartment arthritis. Nonoperative treatment options for knee arthritis were discussed. Visco supplementation was ordered. Patient will follow up once these are approved.

## 2023-08-08 NOTE — REASON FOR VISIT
[Follow-Up Visit] : a follow-up visit for [Workers' Comp: Date of Injury: _____] : This visit is related to worker's compensation. Date of Injury: [unfilled] [Other: ____] : [unfilled] [FreeTextEntry2] : S/p Robotic assisted anterior THR with DIANA DOS:02/07/22

## 2023-08-08 NOTE — PHYSICAL EXAM
[de-identified] : The patient appears well nourished  and in no apparent distress.  The patient is alert and oriented to person, place, and time.   Affect and mood appear normal. The head is normocephalic and atraumatic.  The eyes reveal normal sclera and extra ocular muscles are intact. The tongue is midline with no apparent lesions.  Skin shows normal turgor with no evidence of eczema or psoriasis.  No respiratory distress noted.  Sensation grossly intact.		  [de-identified] : Exam of the right knee shows 0 to 118 degrees of flexion measured with a goniometer. Pain with flexion.  Correctable varus, intact LCL. 5/5 motor strength bilaterally distally. Sensation intact distally.		  [de-identified] : Prior X-ray: 4 views of the right knee demonstrate moderate to severe varus and patellofemoral compartment arthritis.

## 2023-08-16 ENCOUNTER — APPOINTMENT (OUTPATIENT)
Dept: DERMATOLOGY | Facility: CLINIC | Age: 73
End: 2023-08-16
Payer: MEDICARE

## 2023-08-16 PROCEDURE — 17003 DESTRUCT PREMALG LES 2-14: CPT

## 2023-08-16 PROCEDURE — 17000 DESTRUCT PREMALG LESION: CPT

## 2023-08-16 PROCEDURE — 99214 OFFICE O/P EST MOD 30 MIN: CPT | Mod: 25

## 2023-08-21 ENCOUNTER — APPOINTMENT (OUTPATIENT)
Dept: ORTHOPEDIC SURGERY | Facility: CLINIC | Age: 73
End: 2023-08-21
Payer: MEDICARE

## 2023-08-21 VITALS — WEIGHT: 222 LBS | BODY MASS INDEX: 32.88 KG/M2 | HEIGHT: 69 IN

## 2023-08-21 DIAGNOSIS — M12.812 OTHER SPECIFIC ARTHROPATHIES, NOT ELSEWHERE CLASSIFIED, LEFT SHOULDER: ICD-10-CM

## 2023-08-21 PROCEDURE — 99214 OFFICE O/P EST MOD 30 MIN: CPT | Mod: 25

## 2023-08-21 PROCEDURE — 73030 X-RAY EXAM OF SHOULDER: CPT | Mod: LT

## 2023-08-21 PROCEDURE — 20611 DRAIN/INJ JOINT/BURSA W/US: CPT | Mod: LT

## 2023-08-21 NOTE — CONSULT LETTER
[Dear  ___] : Dear  [unfilled], [Consult Letter:] : I had the pleasure of evaluating your patient, [unfilled]. [Please see my note below.] : Please see my note below. [Sincerely,] : Sincerely, [FreeTextEntry3] : Dr. Walter Gallardo

## 2023-08-21 NOTE — ADDENDUM
[FreeTextEntry1] : Documented by Gisselle Clay acting as a scribe for Dr. Gallardo on 08/21/2023. All medical record entries made by the Scribe were at my, Dr. Gallarod's, direction and personally dictated by me on 08/21/2023. I have reviewed the chart and agree that the record accurately reflects my personal performance of the history, physical exam, procedure and imaging.

## 2023-08-21 NOTE — HISTORY OF PRESENT ILLNESS
[de-identified] : Patient is a 73-year-old right-hand-dominant male here today for evaluation of his left shoulder.  He has a history of right shoulder rotator cuff repair several years ago.  He states his left shoulder is always been weak however over the last 6 weeks she started experiencing atraumatic shoulder pain.  Progressively worsening.  He localizes anteriorly and laterally.  He denies any initial trauma or injury.

## 2023-08-21 NOTE — DISCUSSION/SUMMARY
[de-identified] : We had a thorough discussion regarding the nature of his pain, the pathophysiology, as well as all treatment options. Based on clinical exam, MRI and radiograph findings they have L rotator cuff arthropathy. I discussed operative and non-operative treatment modalities. Pt due to his acute pain elected for a corticosteroid injection at today's visit and tolerated the procedure well. He should take it easy for the next 2-3 days while icing the joint. Conservative measures of treatment include rest until asymptomatic, activity avoidance, NSAID's PRN, application to ice to the area 2-3x daily for 20 minutes, with gradual return to activities. All questions were answered and the patient verbalized understanding. The patient is in agreement with this treatment plan. Patient should follow up as needed.

## 2023-08-21 NOTE — PHYSICAL EXAM
[de-identified] : Physical Exam:  General: Well appearing, no acute distress  Neurologic: A&Ox3, No focal deficits  Head: NCAT without abrasions, lacerations, or ecchymosis to head, face, or scalp  Eyes: No scleral icterus, no gross abnormalities  Respiratory: Equal chest wall expansion bilaterally, no accessory muscle use  Lymphatic: No lymphadenopathy palpated  Skin: Warm and dry  Psychiatric: Normal mood and affect  Examination of the Left shoulder shows no obvious deformity, swelling or erythema.  There is atrophy noted of the rotator cuff on the left.  Mild tenderness to palpation over the anterior shoulder. No AC joint tenderness. The patient demonstrates active/passive ROM of Forward Flexion to 165 degrees, External Rotation to 40 degrees and Internal Rotation to a mid lumbar level. The patient has a positive Slaughter and Neers test. No pain with cross body adduction, lift off testing, AC compression testing or Yergason testing. The patient has 4/5 strength to forward flexion with pronation, internal and external rotation. Compartments are soft and nontender. The patient has 2+ cap refill and sensation is intact in the hand.   Right shoulder shows no deformity. No tenderness to palpation over the biceps or AC joint. The patient has Forward Flexion to 170 degrees, External Rotation to 45 degrees and Internal Rotation to a mid lumbar level. 5/5 strength to forward flexion with pronation, internal and external rotation. Compartments are soft and nontender. 2+ cap refill. Sensation intact distally.  [de-identified] : X-rays including 4 views of the left shoulder taken today in the office are reviewed.  These reveal a high riding humeral head.  There are no fractures or acute bony injuries.

## 2023-08-21 NOTE — PROCEDURE
[de-identified] : Injection: US guided Left shoulder (Subacromial).      A discussion was had with the patient regarding this procedure and all questions were answered. All risks, benefits and alternatives were discussed. These included but were not limited to bleeding, infection, and allergic reaction. Alcohol was used to clean the skin, and ChloraPrep was used to sterilize and prep the area in the posterior aspect of the left shoulder. Ethyl chloride spray was then used as a topical anesthetic. A 22-gauge needle was used to inject 3cc 1% xylocaine, 2cc 0.25% bupivacaine and 1cc of 40mg/mL triamcinolone acetonide into the left subacromial space. Ultrasound guidance was used for localization. A sterile band aid was then applied. The patient tolerated the procedure well and there were no complications. Post injection instructions were given.

## 2024-08-20 ENCOUNTER — APPOINTMENT (OUTPATIENT)
Dept: ORTHOPEDIC SURGERY | Facility: CLINIC | Age: 74
End: 2024-08-20
Payer: MEDICARE

## 2024-08-20 VITALS
BODY MASS INDEX: 32.88 KG/M2 | SYSTOLIC BLOOD PRESSURE: 115 MMHG | WEIGHT: 222 LBS | HEIGHT: 69 IN | DIASTOLIC BLOOD PRESSURE: 67 MMHG | HEART RATE: 93 BPM

## 2024-08-20 DIAGNOSIS — Z96.649 PRESENCE OF UNSPECIFIED ARTIFICIAL HIP JOINT: ICD-10-CM

## 2024-08-20 DIAGNOSIS — M16.12 UNILATERAL PRIMARY OSTEOARTHRITIS, LEFT HIP: ICD-10-CM

## 2024-08-20 PROCEDURE — 73502 X-RAY EXAM HIP UNI 2-3 VIEWS: CPT

## 2024-08-20 PROCEDURE — 99215 OFFICE O/P EST HI 40 MIN: CPT

## 2024-08-20 PROCEDURE — G2211 COMPLEX E/M VISIT ADD ON: CPT

## 2024-08-20 NOTE — DISCUSSION/SUMMARY
[de-identified] :  BARAK DUBON is a 74 year old male who presents with left hip bone on bone arthritis. The patient has exhausted a minimum of 3 months conservative treatment including physical therapy, over the counter NSIADS and pain medication where indicated. In addition, the patient's quality of life is diminished due to significant chronic pain. The patient is having difficulty ambulating, descending stairs, and rising from a seated position.  Based upon the patient's continued symptoms and failure to respond to conservative treatment, I have recommended a left total hip replacement for this patient. A long discussion took place with the patient describing what a total joint replacement is and what the procedure would entail. A hip model, similar to the implants that will be used during the operation, was utilized to demonstrate the implants. Choices of implant manufactures were discussed and reviewed. The ability to secure the implant utilizing cement or cementless (press fit) fixation was discussed. Anterior and posterior exposures were discussed. For this patient an anterior approach is appropriate. The patient agrees with the plan of care as well as the use of implants.   The hospitalization and post-operative care and rehabilitation were also discussed. The use of perioperative antibiotics and DVT prophylaxis were discussed. The risk, benefits and alternatives to a surgical intervention were discussed at length with the patient. The patient was also advised of risks related to the medical comorbidities and elevated body mass index (BMI). A lengthy discussion took place to review the most common complications including but not limited to: deep vein thrombosis, pulmonary embolus, heart attack, stroke, infection, wound breakdown, numbness, damage to nerves, tendon, muscles, arteries or other blood vessels, death and other possible complications from anesthesia. The patient was told that we will take steps to minimize these risks by using sterile technique, antibiotics and DVT prophylaxis when appropriate and follow the patient postoperatively in the office setting to monitor progress. The possibility of recurrent pain, no improvement in pain and actual worsening of pain were also discussed with the patient.   The discharge plan of care focused on the patient going home following surgery. The patient was encouraged to make the necessary arrangements to have someone stay with them when they are discharged home. Following discharge, a home care nurse will visit the patient. The home care nurse will open your home care case and request home physical therapy services. Home physical therapy will commence following discharge provided it is appropriate and covered by the health insurance benefit plan.   The benefits of surgery were discussed with the patient including the potential for improving the patient's current clinical condition through operative intervention. Alternatives to surgical intervention including continued conservative management were also discussed in detail. All questions were answered to the satisfaction of the patient. The treatment plan of care, as well as a model of a total hip equivalent to the one that will be used for their total joint replacement, was shared with the patient. The patient participated and agreed to the plan of care as well as the use of the recommended implants for their total hip replacement surgery.   We are planning for robotic assistance for the total hip replacement. We discussed that this will require placement of iliac crest pins in the contralateral iliac crest for pelvic bone registration to allow for robotic guidance for the surgery. We will utilize robotic assistance to improve accuracy of the implants, and accurate restoration of both leg lengths and femoral offset.

## 2024-08-20 NOTE — PHYSICAL EXAM
[de-identified] :  The patient appears well nourished and in no apparent distress. The patient is alert and oriented to person, place, and time. Affect and mood appear normal. The head is normocephalic and atraumatic. The eyes reveal normal sclera and extra ocular muscles are intact. The tongue is midline with no apparent lesions. Skin shows normal turgor with no evidence of eczema or psoriasis. No respiratory distress noted. Sensation grossly intact.  [de-identified] :  Exam of the left hip shows hip flexion of 70 degrees, hip external rotation of 30 degrees, hip internal rotation of 0 degrees. Leg lengths are overall equal.  5/5 motor strength bilaterally distally. Sensation intact distally [de-identified] :  X-ray: AP of the pelvis and 2 views of the left hip demonstrate bone on bone arthritis

## 2024-08-20 NOTE — HISTORY OF PRESENT ILLNESS
[de-identified] :  BARAK DUBON is a 74 year old male who presents with left hip pain. His left hip pain is longstanding but now worsening. He localizes the pain to the groin. He notes the pain is worse with weightbearing activity as well as with all ROM. Patient notes worsening stiffness which now interferes with putting on socks and shoes. Patient had not had injections. He has tried PT and NSAIDs without improvement.

## 2024-08-27 ENCOUNTER — APPOINTMENT (OUTPATIENT)
Dept: DERMATOLOGY | Facility: CLINIC | Age: 74
End: 2024-08-27

## (undated) DEVICE — SEALER BIPOLAR 6.0 AQUAMANTYS

## (undated) DEVICE — HOOD FLYTE STRYKER SURGICOOL W PEELAWAY

## (undated) DEVICE — DRSG IMMOBILIZER SHOULDER QUICK RELEASE LG

## (undated) DEVICE — DRSG KERLIX MED 6"

## (undated) DEVICE — DRAPE IOBAN 23X33"

## (undated) DEVICE — DRAPE 3/4 SHEET 52X76"

## (undated) DEVICE — SUT STRATAFIX SPIRAL PDO 1 36CM CTX

## (undated) DEVICE — SUT VICRYL 2-0 27" CT-2 UNDYED

## (undated) DEVICE — KIT TRACKING HIP PROC HIZADISC STRL

## (undated) DEVICE — WAVEGUIDE ILLUMINATOR EIGR SABER W YANKAUER TAPERED TIP

## (undated) DEVICE — SUT VICRYL 3-0 18" SH UNDYED

## (undated) DEVICE — GLV COTTON LG STERILE

## (undated) DEVICE — SYR LUER LOK 30CC

## (undated) DEVICE — DRAPE SHEET XL 77X98"

## (undated) DEVICE — DRAPE HALF SHEET 40X57"

## (undated) DEVICE — SOL BETADINE POUCH 0.75OZ STERILE

## (undated) DEVICE — SUT SILK 2-0 18" TIES

## (undated) DEVICE — IRRISEPT JET LAVAGE W 0.05 PCT CHG

## (undated) DEVICE — DRAPE SURGICAL #1010

## (undated) DEVICE — DRAPE U LONG 47X70"

## (undated) DEVICE — SUT MONOCRYL 3-0 27" PS-2 UNDYED

## (undated) DEVICE — GLV 8 PROTEXIS

## (undated) DEVICE — NDL HYPO SAFE 22G X 1.5"

## (undated) DEVICE — DRAPE C ARM UNIVERSAL

## (undated) DEVICE — SYR ASEPTO

## (undated) DEVICE — SUT ETHIBOND 2 30" V37

## (undated) DEVICE — PACK TOTAL HIP CUST

## (undated) DEVICE — MAKO DRAPE KIT

## (undated) DEVICE — BLANKET WARMER UPPER ADULT

## (undated) DEVICE — ELCTR EDGE BOVIE COATED BLADE TIP 4"

## (undated) DEVICE — FRAZIER SUCTION TIP 10FR

## (undated) DEVICE — SPONGE RAYTEC 4X4 16PLY

## (undated) DEVICE — POSITIONER FOAM EGG CRATE ULNAR (2PCS)

## (undated) DEVICE — DRAPE TOWEL BLUE STICKY

## (undated) DEVICE — WRAP COMPRESSION CALF MED

## (undated) DEVICE — NDL HYPO SAFE 20G X 1.5"

## (undated) DEVICE — SUT DERMABOND 0.7ML

## (undated) DEVICE — DRSG COBAN 4"

## (undated) DEVICE — DRAPE IOBAN 17X23"

## (undated) DEVICE — GLV 8 DURAPRENE